# Patient Record
Sex: MALE | Race: WHITE | NOT HISPANIC OR LATINO | Employment: UNEMPLOYED | URBAN - METROPOLITAN AREA
[De-identification: names, ages, dates, MRNs, and addresses within clinical notes are randomized per-mention and may not be internally consistent; named-entity substitution may affect disease eponyms.]

---

## 2018-10-19 ENCOUNTER — HOSPITAL ENCOUNTER (OUTPATIENT)
Dept: PAIN MEDICINE | Facility: HOSPITAL | Age: 23
Discharge: HOME | End: 2018-10-19
Attending: ANESTHESIOLOGY | Admitting: ANESTHESIOLOGY

## 2019-06-18 ENCOUNTER — HOSPITAL ENCOUNTER (EMERGENCY)
Facility: HOSPITAL | Age: 24
Discharge: HOME | End: 2019-06-18
Attending: NURSE PRACTITIONER | Admitting: EMERGENCY MEDICINE

## 2019-06-18 VITALS
BODY MASS INDEX: 27.47 KG/M2 | TEMPERATURE: 98.3 F | RESPIRATION RATE: 16 BRPM | OXYGEN SATURATION: 99 % | SYSTOLIC BLOOD PRESSURE: 110 MMHG | DIASTOLIC BLOOD PRESSURE: 73 MMHG | HEIGHT: 62 IN | HEART RATE: 79 BPM | WEIGHT: 149.25 LBS

## 2019-06-18 DIAGNOSIS — L03.116 CELLULITIS OF LEFT LOWER LEG: Primary | ICD-10-CM

## 2019-06-18 PROCEDURE — 99282 EMERGENCY DEPT VISIT SF MDM: CPT

## 2019-06-18 RX ORDER — SULFAMETHOXAZOLE AND TRIMETHOPRIM 800; 160 MG/1; MG/1
1 TABLET ORAL 2 TIMES DAILY
Qty: 20 TABLET | Refills: 0 | Status: SHIPPED | OUTPATIENT
Start: 2019-06-18 | End: 2019-07-24

## 2019-07-24 ENCOUNTER — HOSPITAL ENCOUNTER (EMERGENCY)
Facility: HOSPITAL | Age: 24
Discharge: HOME | End: 2019-07-24
Admitting: EMERGENCY MEDICINE

## 2019-07-24 VITALS
RESPIRATION RATE: 18 BRPM | BODY MASS INDEX: 22.13 KG/M2 | WEIGHT: 146 LBS | TEMPERATURE: 98.1 F | HEIGHT: 68 IN | DIASTOLIC BLOOD PRESSURE: 69 MMHG | HEART RATE: 77 BPM | OXYGEN SATURATION: 97 % | SYSTOLIC BLOOD PRESSURE: 124 MMHG

## 2019-07-24 DIAGNOSIS — H66.011 ACUTE SUPPURATIVE OTITIS MEDIA OF RIGHT EAR WITH SPONTANEOUS RUPTURE OF TYMPANIC MEMBRANE, RECURRENCE NOT SPECIFIED: Primary | ICD-10-CM

## 2019-07-24 PROCEDURE — 99283 EMERGENCY DEPT VISIT LOW MDM: CPT

## 2019-07-24 RX ORDER — HYDROCODONE BITARTRATE AND ACETAMINOPHEN 5; 325 MG/1; MG/1
1 TABLET ORAL ONCE AS NEEDED
Status: DISCONTINUED | OUTPATIENT
Start: 2019-07-24 | End: 2019-07-24 | Stop reason: HOSPADM

## 2019-07-24 RX ORDER — AMOXICILLIN AND CLAVULANATE POTASSIUM 875; 125 MG/1; MG/1
1 TABLET, FILM COATED ORAL 2 TIMES DAILY
Qty: 20 TABLET | Refills: 0 | Status: SHIPPED | OUTPATIENT
Start: 2019-07-24 | End: 2019-08-03

## 2019-07-24 RX ADMIN — HYDROCODONE BITARTRATE AND ACETAMINOPHEN 1 TABLET: 5; 325 TABLET ORAL at 13:34

## 2019-07-26 DIAGNOSIS — H66.009 ACUTE SUPPURATIVE OTITIS MEDIA WITHOUT SPONTANEOUS RUPTURE OF EAR DRUM, RECURRENCE NOT SPECIFIED, UNSPECIFIED LATERALITY: Primary | ICD-10-CM

## 2019-08-10 ENCOUNTER — APPOINTMENT (OUTPATIENT)
Dept: GENERAL RADIOLOGY | Facility: HOSPITAL | Age: 24
End: 2019-08-10

## 2019-08-10 ENCOUNTER — HOSPITAL ENCOUNTER (EMERGENCY)
Facility: HOSPITAL | Age: 24
Discharge: HOME | End: 2019-08-10
Attending: EMERGENCY MEDICINE | Admitting: EMERGENCY MEDICINE

## 2019-08-10 VITALS
HEART RATE: 67 BPM | SYSTOLIC BLOOD PRESSURE: 122 MMHG | DIASTOLIC BLOOD PRESSURE: 74 MMHG | HEIGHT: 69 IN | RESPIRATION RATE: 14 BRPM | WEIGHT: 140 LBS | OXYGEN SATURATION: 98 % | TEMPERATURE: 98.3 F | BODY MASS INDEX: 20.73 KG/M2

## 2019-08-10 DIAGNOSIS — IMO0001 GRADE 2 ANKLE SPRAIN, LEFT, INITIAL ENCOUNTER: Primary | ICD-10-CM

## 2019-08-10 PROCEDURE — 73630 X-RAY EXAM OF FOOT: CPT

## 2019-08-10 PROCEDURE — 99284 EMERGENCY DEPT VISIT MOD MDM: CPT

## 2019-08-10 PROCEDURE — 25010000002 PROMETHAZINE PER 50 MG: Performed by: EMERGENCY MEDICINE

## 2019-08-10 PROCEDURE — 73610 X-RAY EXAM OF ANKLE: CPT

## 2019-08-10 PROCEDURE — 96372 THER/PROPH/DIAG INJ SC/IM: CPT

## 2019-08-10 PROCEDURE — 25010000002 MORPHINE PER 10 MG: Performed by: EMERGENCY MEDICINE

## 2019-08-10 RX ORDER — MORPHINE SULFATE 4 MG/ML
4 INJECTION, SOLUTION INTRAMUSCULAR; INTRAVENOUS ONCE
Status: COMPLETED | OUTPATIENT
Start: 2019-08-10 | End: 2019-08-10

## 2019-08-10 RX ORDER — PROMETHAZINE HYDROCHLORIDE 25 MG/ML
12.5 INJECTION, SOLUTION INTRAMUSCULAR; INTRAVENOUS ONCE
Status: COMPLETED | OUTPATIENT
Start: 2019-08-10 | End: 2019-08-10

## 2019-08-10 RX ORDER — ACETAMINOPHEN AND CODEINE PHOSPHATE 300; 30 MG/1; MG/1
1 TABLET ORAL EVERY 6 HOURS PRN
Qty: 8 TABLET | Refills: 0 | Status: SHIPPED | OUTPATIENT
Start: 2019-08-10 | End: 2020-07-27

## 2019-08-10 RX ORDER — OMEPRAZOLE 20 MG/1
20 CAPSULE, DELAYED RELEASE ORAL DAILY
COMMUNITY
End: 2019-09-25 | Stop reason: SDUPTHER

## 2019-08-10 RX ORDER — LEVETIRACETAM 500 MG/1
500 TABLET ORAL 2 TIMES DAILY
COMMUNITY
End: 2021-10-02

## 2019-08-10 RX ADMIN — PROMETHAZINE HYDROCHLORIDE 12.5 MG: 25 INJECTION INTRAMUSCULAR; INTRAVENOUS at 19:48

## 2019-08-10 RX ADMIN — MORPHINE SULFATE 4 MG: 4 INJECTION INTRAVENOUS at 19:47

## 2019-08-11 NOTE — ED PROVIDER NOTES
Subjective   23-year-old states he slipped off a ladder and had an inversion type mechanism injury to his left foot and ankle.  He states it hurts on the outside of the ankle.  He reports no decrease in sensation or circulation.  Reports no proximal pain or other injury.  He denies heel pain or low back pain            Review of Systems   Musculoskeletal: Positive for joint swelling.   All other systems reviewed and are negative.      Past Medical History:   Diagnosis Date   • GERD (gastroesophageal reflux disease)    • Seizures (CMS/HCC)        Allergies   Allergen Reactions   • Tramadol Nausea And Vomiting       History reviewed. No pertinent surgical history.    History reviewed. No pertinent family history.    Social History     Socioeconomic History   • Marital status: Single     Spouse name: Not on file   • Number of children: Not on file   • Years of education: Not on file   • Highest education level: Not on file   Tobacco Use   • Smoking status: Current Every Day Smoker     Packs/day: 1.00   Substance and Sexual Activity   • Alcohol use: Yes     Frequency: Never   • Drug use: Yes     Frequency: 4.0 times per week     Types: Marijuana   • Sexual activity: Defer           Objective   Physical Exam  Alert nontoxic [place] Coma Scale 15  Back: No lumbar tenderness  Left lower extremity: Moderate lateral ankle contusion and edema.  Garibay's test is normal.  There is no calcaneal tenderness.  Patient has some discomfort noted in the midfoot it the level of the base the fifth metatarsal.  Procedures           ED Course        Labs Reviewed - No data to display  Medications   Morphine sulfate (PF) injection 4 mg (4 mg Intramuscular Given 8/10/19 1947)   promethazine (PHENERGAN) injection 12.5 mg (12.5 mg Intramuscular Given 8/10/19 1948)     No radiology results for the last day            MDM  Number of Diagnoses or Management Options     Amount and/or Complexity of Data Reviewed  Tests in the radiology section  of CPT®: reviewed  Independent visualization of images, tracings, or specimens: yes    Risk of Complications, Morbidity, and/or Mortality  Presenting problems: moderate  Diagnostic procedures: moderate  Management options: moderate  General comments: Patient was fitted with crutches and was able to demonstrate correct use.  The patient was fitted in an Aircast and was neurovascular intact afterwards.  Vocalized understanding of discharge instructions and warnings    Patient Progress  Patient progress: improved        Final diagnoses:   Grade 2 ankle sprain, left, initial encounter            Saad Tavera MD  08/10/19 5753

## 2019-08-11 NOTE — DISCHARGE INSTRUCTIONS
Rest, elevate next 24 hours  Use crutches and no weightbearing for the next 5 days  Use Aircast for the next 5 days  Follow-up with primary care provider or orthopedist

## 2019-09-25 ENCOUNTER — OFFICE VISIT (OUTPATIENT)
Dept: FAMILY MEDICINE CLINIC | Facility: CLINIC | Age: 24
End: 2019-09-25

## 2019-09-25 VITALS
BODY MASS INDEX: 22.44 KG/M2 | OXYGEN SATURATION: 100 % | HEART RATE: 72 BPM | DIASTOLIC BLOOD PRESSURE: 75 MMHG | WEIGHT: 143 LBS | TEMPERATURE: 97.4 F | HEIGHT: 67 IN | SYSTOLIC BLOOD PRESSURE: 116 MMHG

## 2019-09-25 DIAGNOSIS — M54.50 CHRONIC MIDLINE LOW BACK PAIN WITHOUT SCIATICA: Primary | ICD-10-CM

## 2019-09-25 DIAGNOSIS — G89.29 CHRONIC MIDLINE LOW BACK PAIN WITHOUT SCIATICA: Primary | ICD-10-CM

## 2019-09-25 DIAGNOSIS — K21.9 GASTROESOPHAGEAL REFLUX DISEASE WITHOUT ESOPHAGITIS: ICD-10-CM

## 2019-09-25 PROCEDURE — 99213 OFFICE O/P EST LOW 20 MIN: CPT | Performed by: FAMILY MEDICINE

## 2019-09-25 RX ORDER — OMEPRAZOLE 20 MG/1
20 CAPSULE, DELAYED RELEASE ORAL 2 TIMES DAILY
Qty: 60 CAPSULE | Refills: 2 | Status: SHIPPED | OUTPATIENT
Start: 2019-09-25 | End: 2020-01-22

## 2019-09-25 NOTE — ASSESSMENT & PLAN NOTE
"Tylenol for pain prn.  Avoid NSAIDs due to stomach problems.   X-rays ordered.  He says he had some in the ER in the last few months.  He has been to the ER for multiple problems but the last lumbar x-rays were done over a year ago.  Pt upset that he \"just has to live with the pain\".  I told him he can take Tylenol.  He may need PT but I want to see x-rays first.  I am not giving him controlled substance.  "

## 2019-09-25 NOTE — ASSESSMENT & PLAN NOTE
Counseled to take Prilosec 20 me every morning.  May take a second dose later in the day if needed.

## 2019-09-25 NOTE — PROGRESS NOTES
Subjective   Chief Complaint   Patient presents with   • Back Pain   • Heartburn     refill med     Lee Peck is a 24 y.o. male.     Accompanied by his mother.    He takes Prilosec but does not take it every day.  If he does not get relief within 30 minutes of taking the medicine he takes another one.  I instructed him to take the medicine every day so that it will work better.          Heartburn   He complains of abdominal pain and heartburn. He reports no chest pain, no coughing, no globus sensation, no hoarse voice, no nausea, no sore throat or no stridor. This is a recurrent problem. The current episode started more than 1 year ago. The problem occurs frequently. The problem has been waxing and waning. The heartburn duration is several minutes. The heartburn is located in the substernum. The heartburn is of severe intensity. The heartburn wakes him from sleep. The heartburn doesn't change with position. The symptoms are aggravated by certain foods. Pertinent negatives include no anemia, fatigue or melena. Risk factors include smoking/tobacco exposure. He has tried a PPI for the symptoms. The treatment provided mild relief.   Back Pain   This is a chronic problem. The current episode started more than 1 year ago. The problem occurs daily. The problem has been gradually worsening since onset. The pain is present in the lumbar spine. The quality of the pain is described as aching. The pain does not radiate. The pain is moderate. The symptoms are aggravated by bending. Associated symptoms include abdominal pain. Pertinent negatives include no chest pain, fever or headaches. He has tried analgesics for the symptoms. The treatment provided mild relief.      Past Medical History:   Diagnosis Date   • GERD (gastroesophageal reflux disease)    • Seizures (CMS/HCC)      History reviewed. No pertinent surgical history.  Allergies   Allergen Reactions   • Tramadol Nausea And Vomiting     Social History     Socioeconomic  History   • Marital status: Single     Spouse name: Not on file   • Number of children: Not on file   • Years of education: Not on file   • Highest education level: Not on file   Tobacco Use   • Smoking status: Current Every Day Smoker     Packs/day: 1.00     Years: 8.00     Pack years: 8.00     Types: Cigarettes, Electronic Cigarette   • Smokeless tobacco: Never Used   Substance and Sexual Activity   • Alcohol use: Yes     Frequency: Monthly or less     Comment: rarely; caffeine 8-10c qd   • Drug use: Yes     Frequency: 4.0 times per week     Types: Marijuana   • Sexual activity: Defer     Social History     Tobacco Use   Smoking Status Current Every Day Smoker   • Packs/day: 1.00   • Years: 8.00   • Pack years: 8.00   • Types: Cigarettes, Electronic Cigarette   Smokeless Tobacco Never Used       family history is not on file.  Current Outpatient Medications on File Prior to Visit   Medication Sig Dispense Refill   • acetaminophen-codeine (TYLENOL #3) 300-30 MG per tablet Take 1 tablet by mouth Every 6 (Six) Hours As Needed for Moderate Pain . 8 tablet 0   • levETIRAcetam (KEPPRA) 500 MG tablet Take 500 mg by mouth 2 (Two) Times a Day.     • [DISCONTINUED] omeprazole (priLOSEC) 20 MG capsule Take 20 mg by mouth Daily.       No current facility-administered medications on file prior to visit.      Patient Active Problem List   Diagnosis   • Epilepsy without status epilepticus, not intractable (CMS/HCC)   • Low back pain   • Gastroesophageal reflux disease without esophagitis       The following portions of the patient's history were reviewed and updated as appropriate: allergies, current medications, past family history, past medical history, past social history, past surgical history and problem list.    Review of Systems   Constitutional: Negative for chills, fatigue and fever.   HENT: Negative for hoarse voice, sinus pressure and sore throat.    Eyes: Negative for blurred vision.   Respiratory: Negative for cough  "and shortness of breath.    Cardiovascular: Negative for chest pain and palpitations.   Gastrointestinal: Positive for abdominal pain. Negative for melena and nausea.   Endocrine: Negative for polyuria.   Musculoskeletal: Positive for back pain.   Skin: Negative for rash.   Neurological: Negative for dizziness and headache.   Hematological: Negative for adenopathy.   Psychiatric/Behavioral: Negative for depressed mood.       Objective   /75 (BP Location: Left arm, Patient Position: Sitting, Cuff Size: Adult)   Pulse 72   Temp 97.4 °F (36.3 °C) (Oral)   Ht 168.9 cm (66.5\")   Wt 64.9 kg (143 lb)   SpO2 100%   BMI 22.74 kg/m²   Physical Exam   Constitutional: He is oriented to person, place, and time. He appears well-developed. No distress.   HENT:   Head: Normocephalic.   Eyes: Conjunctivae and lids are normal.   Neck: Trachea normal. No thyroid mass and no thyromegaly present.   Cardiovascular: Normal rate, regular rhythm and normal heart sounds.   Pulmonary/Chest: Effort normal and breath sounds normal.   Musculoskeletal: He exhibits tenderness.   Lymphadenopathy:     He has no cervical adenopathy.   Neurological: He is alert and oriented to person, place, and time.   Skin: Skin is warm and dry.   Psychiatric: He has a normal mood and affect. His speech is normal and behavior is normal. He is attentive.       No visits with results within 1 Week(s) from this visit.   Latest known visit with results is:   Admission on 06/13/2019, Discharged on 06/13/2019   Component Date Value Ref Range Status   • Strep A Ag 06/13/2019  POSITIVE* NEGATIVE Final           Assessment/Plan   Problems Addressed this Visit        Digestive    Gastroesophageal reflux disease without esophagitis     Counseled to take Prilosec 20 me every morning.  May take a second dose later in the day if needed.          Relevant Medications    omeprazole (priLOSEC) 20 MG capsule       Nervous and Auditory    Low back pain - Primary     " "Tylenol for pain prn.  Avoid NSAIDs due to stomach problems.   X-rays ordered.  He says he had some in the ER in the last few months.  He has been to the ER for multiple problems but the last lumbar x-rays were done over a year ago.  Pt upset that he \"just has to live with the pain\".  I told him he can take Tylenol.  He may need PT but I want to see x-rays first.  I am not giving him controlled substance.         Relevant Orders    XR Spine Lumbar 2 or 3 View          Lee was seen today for back pain and heartburn.    Diagnoses and all orders for this visit:    Chronic midline low back pain without sciatica  -     XR Spine Lumbar 2 or 3 View; Future    Gastroesophageal reflux disease without esophagitis    Other orders  -     omeprazole (priLOSEC) 20 MG capsule; Take 1 capsule by mouth 2 (Two) Times a Day.           "

## 2019-09-25 NOTE — PATIENT INSTRUCTIONS
Acute Back Pain, Adult  Acute back pain is sudden and usually short-lived. It is often caused by an injury to the muscles and tissues in the back. The injury may result from:  · A muscle or ligament getting overstretched or torn (strained). Ligaments are tissues that connect bones to each other. Lifting something improperly can cause a back strain.  · Wear and tear (degeneration) of the spinal disks. Spinal disks are circular tissue that provides cushioning between the bones of the spine (vertebrae).  · Twisting motions, such as while playing sports or doing yard work.  · A hit to the back.  · Arthritis.  You may have a physical exam, lab tests, and imaging tests to find the cause of your pain. Acute back pain usually goes away with rest and home care.  Follow these instructions at home:  Managing pain, stiffness, and swelling  · Take over-the-counter and prescription medicines only as told by your health care provider.  · Your health care provider may recommend applying ice during the first 24-48 hours after your pain starts. To do this:  ? Put ice in a plastic bag.  ? Place a towel between your skin and the bag.  ? Leave the ice on for 20 minutes, 2-3 times a day.  · If directed, apply heat to the affected area as often as told by your health care provider. Use the heat source that your health care provider recommends, such as a moist heat pack or a heating pad.  ? Place a towel between your skin and the heat source.  ? Leave the heat on for 20-30 minutes.  ? Remove the heat if your skin turns bright red. This is especially important if you are unable to feel pain, heat, or cold. You have a greater risk of getting burned.  Activity    · Do not stay in bed. Staying in bed for more than 1-2 days can delay your recovery.  · Sit up and stand up straight. Avoid leaning forward when you sit, or hunching over when you stand.  ? If you work at a desk, sit close to it so you do not need to lean over. Keep your chin tucked  "in. Keep your neck drawn back, and keep your elbows bent at a right angle. Your arms should look like the letter \"L.\"  ? Sit high and close to the steering wheel when you drive. Add lower back (lumbar) support to your car seat, if needed.  · Take short walks on even surfaces as soon as you are able. Try to increase the length of time you walk each day.  · Do not sit, drive, or  one place for more than 30 minutes at a time. Sitting or standing for long periods of time can put stress on your back.  · Do not drive or use heavy machinery while taking prescription pain medicine.  · Use proper lifting techniques. When you bend and lift, use positions that put less stress on your back:  ? Bend your knees.  ? Keep the load close to your body.  ? Avoid twisting.  · Exercise regularly as told by your health care provider. Exercising helps your back heal faster and helps prevent back injuries by keeping muscles strong and flexible.  · Work with a physical therapist to make a safe exercise program, as recommended by your health care provider. Do any exercises as told by your physical therapist.  Lifestyle  · Maintain a healthy weight. Extra weight puts stress on your back and makes it difficult to have good posture.  · Avoid activities or situations that make you feel anxious or stressed. Stress and anxiety increase muscle tension and can make back pain worse. Learn ways to manage anxiety and stress, such as through exercise.  General instructions    · Sleep on a firm mattress in a comfortable position. Try lying on your side with your knees slightly bent. If you lie on your back, put a pillow under your knees.  · Follow your treatment plan as told by your health care provider. This may include:  ? Cognitive or behavioral therapy.  ? Acupuncture or massage therapy.  ? Meditation or yoga.  Contact a health care provider if:  · You have pain that is not relieved with rest or medicine.  · You have increasing pain going " down into your legs or buttocks.  · Your pain does not improve after 2 weeks.  · You have pain at night.  · You lose weight without trying.  · You have a fever or chills.  Get help right away if:  · You develop new bowel or bladder control problems.  · You have unusual weakness or numbness in your arms or legs.  · You develop nausea or vomiting.  · You develop abdominal pain.  · You feel faint.  Summary  · Acute back pain is sudden and usually short-lived.  · Use proper lifting techniques. When you bend and lift, use positions that put less stress on your back.  · Take over-the-counter and prescription medicines and apply heat or ice as directed by your health care provider.  This information is not intended to replace advice given to you by your health care provider. Make sure you discuss any questions you have with your health care provider.  Document Released: 12/18/2006 Document Revised: 08/01/2018 Document Reviewed: 08/01/2018  [employer] Interactive Patient Education © 2019 [employer]

## 2019-10-13 ENCOUNTER — HOSPITAL ENCOUNTER (EMERGENCY)
Facility: HOSPITAL | Age: 24
Discharge: HOME | End: 2019-10-13
Admitting: EMERGENCY MEDICINE

## 2019-10-13 ENCOUNTER — APPOINTMENT (OUTPATIENT)
Dept: GENERAL RADIOLOGY | Facility: HOSPITAL | Age: 24
End: 2019-10-13

## 2019-10-13 VITALS
OXYGEN SATURATION: 100 % | RESPIRATION RATE: 16 BRPM | HEIGHT: 68 IN | BODY MASS INDEX: 21.22 KG/M2 | TEMPERATURE: 98.6 F | HEART RATE: 63 BPM | SYSTOLIC BLOOD PRESSURE: 120 MMHG | DIASTOLIC BLOOD PRESSURE: 63 MMHG | WEIGHT: 140 LBS

## 2019-10-13 DIAGNOSIS — G89.29 CHRONIC BILATERAL LOW BACK PAIN WITHOUT SCIATICA: Primary | ICD-10-CM

## 2019-10-13 DIAGNOSIS — M54.50 CHRONIC BILATERAL LOW BACK PAIN WITHOUT SCIATICA: Primary | ICD-10-CM

## 2019-10-13 LAB
BILIRUB UR QL STRIP: NEGATIVE
CLARITY UR: CLEAR
COLOR UR: YELLOW
GLUCOSE UR STRIP-MCNC: NEGATIVE MG/DL
HGB UR QL STRIP.AUTO: NEGATIVE
KETONES UR QL STRIP: NEGATIVE
LEUKOCYTE ESTERASE UR QL STRIP.AUTO: NEGATIVE
NITRITE UR QL STRIP: NEGATIVE
PH UR STRIP.AUTO: 7.5 [PH] (ref 5–8)
PROT UR QL STRIP: NEGATIVE
SP GR UR STRIP: 1.02 (ref 1–1.03)
UROBILINOGEN UR QL STRIP: NORMAL

## 2019-10-13 PROCEDURE — 81003 URINALYSIS AUTO W/O SCOPE: CPT | Performed by: PHYSICIAN ASSISTANT

## 2019-10-13 PROCEDURE — 72110 X-RAY EXAM L-2 SPINE 4/>VWS: CPT

## 2019-10-13 PROCEDURE — 99283 EMERGENCY DEPT VISIT LOW MDM: CPT

## 2019-10-13 RX ORDER — METHYLPREDNISOLONE 4 MG/1
TABLET ORAL
Qty: 21 TABLET | Refills: 0 | Status: SHIPPED | OUTPATIENT
Start: 2019-10-13 | End: 2020-07-23 | Stop reason: SDUPTHER

## 2019-10-13 RX ORDER — LIDOCAINE 50 MG/G
1 PATCH TOPICAL EVERY 24 HOURS
Qty: 30 PATCH | Refills: 0 | Status: SHIPPED | OUTPATIENT
Start: 2019-10-13 | End: 2020-07-23 | Stop reason: SDUPTHER

## 2019-10-13 NOTE — ED PROVIDER NOTES
Subjective   History:  Patient is 24-year-old male who presents to the ER with low back pain.  He reports he is had low back pain since he was 16 years old.  He sees Jaquelin Medina she went to get x-rays before putting him into physical therapy.  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  Patient denies any bowel or bladder incontinence    Onset: Acutely worse 1 week  Location: Low back  Duration: Constant  Character: Sharp pain  Aggravating/Alleviating factors: None  Radiation none  Severity: Moderate              Review of Systems   Constitutional: Negative.    HENT: Negative.    Respiratory: Negative.    Cardiovascular: Negative.    Gastrointestinal: Negative.    Genitourinary: Negative.    Musculoskeletal: Positive for back pain.   Skin: Negative.    Neurological: Negative.    Psychiatric/Behavioral: Negative.        Past Medical History:   Diagnosis Date   • GERD (gastroesophageal reflux disease)    • Seizures (CMS/HCC)        Allergies   Allergen Reactions   • Tramadol Nausea And Vomiting       No past surgical history on file.    No family history on file.    Social History     Socioeconomic History   • Marital status: Single     Spouse name: Not on file   • Number of children: Not on file   • Years of education: Not on file   • Highest education level: Not on file   Tobacco Use   • Smoking status: Current Every Day Smoker     Packs/day: 1.00     Years: 8.00     Pack years: 8.00     Types: Cigarettes, Electronic Cigarette   • Smokeless tobacco: Never Used   Substance and Sexual Activity   • Alcohol use: Yes     Frequency: Monthly or less     Comment: rarely; caffeine 8-10c qd   • Drug use: Yes     Frequency: 4.0 times per week     Types: Marijuana   • Sexual activity: Defer           Objective   Physical Exam   Constitutional: He is oriented to person, place, and time. He appears well-developed and well-nourished.   HENT:    Head: Normocephalic and atraumatic.   Eyes: Pupils are equal, round, and reactive to light.   Neck: Normal range of motion.   Pulmonary/Chest: Effort normal.   Musculoskeletal: Normal range of motion.   Patient able to ambulate without difficulty.  Flexion-extension of back intact without difficulty.  No signs of step-off or pain over the spinous process.   Neurological: He is alert and oriented to person, place, and time.   Skin: Skin is warm and dry.   Psychiatric: He has a normal mood and affect. His behavior is normal.       Procedures           ED Course        Xr Spine Lumbar 4+ View    Result Date: 10/13/2019   1. No radiographic evidence of acute fracture or subluxation. 2. Stable disc space narrowing at L5-S1.  Electronically Signed By-Clem Ragland On:10/13/2019 1:33 PM This report was finalized on [id number] by  Clem Ragland, .    Labs Reviewed   URINALYSIS W/ CULTURE IF INDICATED - Normal    Narrative:     Urine microscopic not indicated.     Medications - No data to display            MDM  Number of Diagnoses or Management Options  Chronic bilateral low back pain without sciatica:   Diagnosis management comments: DISPOSITION:   Chart Review:  Comorbidity:  has a past medical history of GERD (gastroesophageal reflux disease) and Seizures (CMS/HCC).  Differentials:this list is not all inclusive and does not constitute the entirety of considered causes --> UTI or nephrolithiasis chronic OA of low back  Labs: UA unremarkable    Imaging: Was interpreted by physician and reviewed by myself:  Xr Spine Lumbar 4+ View    Result Date: 10/13/2019   1. No radiographic evidence of acute fracture or subluxation. 2. Stable disc space narrowing at L5-S1.  Electronically Signed ByRoel Ragland On:10/13/2019 1:33 PM This report was finalized on [id number] by  Clem Ragland, .      Disposition/Treatment:  24-year-old male who presents to the ER with low back pain his x-rays were negative.  Patient was told to  follow-up with his PCP tomorrow he was given a Medrol Dosepak and lidocaine patches he was told to continue taking Tylenol.  He was stable at time of discharge return precautions all concerns were provided he was in agreement with plan       Amount and/or Complexity of Data Reviewed  Clinical lab tests: reviewed  Tests in the radiology section of CPT®: reviewed        Final diagnoses:   Chronic bilateral low back pain without sciatica              Krystyna Robison PA-C  10/13/19 9122

## 2019-10-13 NOTE — DISCHARGE INSTRUCTIONS
Return to the ER for any worsening symptoms.  Follow-up with your primary care doctor tomorrow for further management and possible physical therapy orders.

## 2019-10-14 ENCOUNTER — TELEPHONE (OUTPATIENT)
Dept: FAMILY MEDICINE CLINIC | Facility: CLINIC | Age: 24
End: 2019-10-14

## 2019-10-14 DIAGNOSIS — G89.29 CHRONIC MIDLINE LOW BACK PAIN WITHOUT SCIATICA: Primary | ICD-10-CM

## 2019-10-14 DIAGNOSIS — M54.50 CHRONIC MIDLINE LOW BACK PAIN WITHOUT SCIATICA: Primary | ICD-10-CM

## 2019-10-16 NOTE — TELEPHONE ENCOUNTER
His x-rays are normal.  I am not going to send in anything pain since his x-rays are normal.  Does he want a referral to Pain Mgmt?

## 2019-11-21 ENCOUNTER — OFFICE VISIT (OUTPATIENT)
Dept: PAIN MEDICINE | Facility: CLINIC | Age: 24
End: 2019-11-21

## 2019-11-21 VITALS
HEART RATE: 64 BPM | WEIGHT: 147 LBS | DIASTOLIC BLOOD PRESSURE: 73 MMHG | BODY MASS INDEX: 22.28 KG/M2 | SYSTOLIC BLOOD PRESSURE: 111 MMHG | HEIGHT: 68 IN | OXYGEN SATURATION: 100 % | RESPIRATION RATE: 16 BRPM | TEMPERATURE: 98.3 F

## 2019-11-21 DIAGNOSIS — M51.36 DDD (DEGENERATIVE DISC DISEASE), LUMBAR: ICD-10-CM

## 2019-11-21 DIAGNOSIS — G89.29 CHRONIC MIDLINE LOW BACK PAIN WITHOUT SCIATICA: Primary | ICD-10-CM

## 2019-11-21 DIAGNOSIS — M54.50 CHRONIC MIDLINE LOW BACK PAIN WITHOUT SCIATICA: Primary | ICD-10-CM

## 2019-11-21 DIAGNOSIS — M54.16 LUMBAR RADICULOPATHY: ICD-10-CM

## 2019-11-21 PROBLEM — M51.369 DDD (DEGENERATIVE DISC DISEASE), LUMBAR: Status: ACTIVE | Noted: 2019-11-21

## 2019-11-21 PROCEDURE — G0463 HOSPITAL OUTPT CLINIC VISIT: HCPCS | Performed by: PHYSICAL MEDICINE & REHABILITATION

## 2019-11-21 PROCEDURE — 99204 OFFICE O/P NEW MOD 45 MIN: CPT | Performed by: PHYSICAL MEDICINE & REHABILITATION

## 2019-11-21 NOTE — PROGRESS NOTES
Subjective   Lee Peck is a 24 y.o. male.     Chronic LBP, began playing football, midline, saw Dr. Lalo dumont with lumbar radiculopathy, deep, stabbing, always present, varies, worse with bending, lifting, lying down, interferes with ADLs, sleep, work, failed PT. X-ray with L5/S1 DDD. Saw PCP, notes reviewed, as above, failed Lidoderm, PT. No FH of substance abuse.         The following portions of the patient's history were reviewed and updated as appropriate: allergies, current medications, past family history, past medical history, past social history, past surgical history and problem list.    Review of Systems   Constitutional: Negative for chills, fatigue and fever.   HENT: Negative for hearing loss and trouble swallowing.    Eyes: Negative for visual disturbance.   Respiratory: Negative for shortness of breath.    Cardiovascular: Negative for chest pain.   Gastrointestinal: Negative for abdominal pain, constipation, diarrhea, nausea and vomiting.   Genitourinary: Negative for urinary incontinence.   Musculoskeletal: Positive for back pain. Negative for arthralgias, joint swelling, myalgias and neck pain.   Neurological: Negative for dizziness, weakness, numbness and headache.       Objective   Physical Exam   Constitutional: He is oriented to person, place, and time. He appears well-developed and well-nourished.   HENT:   Head: Normocephalic and atraumatic.   Eyes: EOM are normal. Pupils are equal, round, and reactive to light.   Neck: Normal range of motion.   Cardiovascular: Normal rate, regular rhythm, normal heart sounds and intact distal pulses.   Pulmonary/Chest: Breath sounds normal.   Abdominal: Soft. Bowel sounds are normal. He exhibits no distension. There is no tenderness.   Neurological: He is alert and oriented to person, place, and time. He has normal strength and normal reflexes. He displays normal reflexes. No sensory deficit.   Psychiatric: He has a normal mood and affect. His behavior is  normal. Thought content normal.         Assessment/Plan   Lee was seen today for back pain.    Diagnoses and all orders for this visit:    Chronic midline low back pain without sciatica    Lumbar radiculopathy    DDD (degenerative disc disease), lumbar      Order MRI L-spine  Schedule 3 L5/S1 ILESIs pending MRI results.  Begin Flector.  Order LSO.  RTC for ESIs.

## 2019-11-24 ENCOUNTER — HOSPITAL ENCOUNTER (EMERGENCY)
Facility: HOSPITAL | Age: 24
Discharge: HOME | End: 2019-11-24
Attending: EMERGENCY MEDICINE | Admitting: EMERGENCY MEDICINE

## 2019-11-24 ENCOUNTER — APPOINTMENT (OUTPATIENT)
Dept: GENERAL RADIOLOGY | Facility: HOSPITAL | Age: 24
End: 2019-11-24

## 2019-11-24 VITALS
BODY MASS INDEX: 23.07 KG/M2 | RESPIRATION RATE: 15 BRPM | HEIGHT: 67 IN | SYSTOLIC BLOOD PRESSURE: 120 MMHG | OXYGEN SATURATION: 97 % | DIASTOLIC BLOOD PRESSURE: 68 MMHG | HEART RATE: 71 BPM | TEMPERATURE: 98.9 F | WEIGHT: 147 LBS

## 2019-11-24 DIAGNOSIS — R07.9 CHEST PAIN, UNSPECIFIED TYPE: ICD-10-CM

## 2019-11-24 DIAGNOSIS — R11.2 NAUSEA AND VOMITING, INTRACTABILITY OF VOMITING NOT SPECIFIED, UNSPECIFIED VOMITING TYPE: Primary | ICD-10-CM

## 2019-11-24 PROCEDURE — 71045 X-RAY EXAM CHEST 1 VIEW: CPT

## 2019-11-24 PROCEDURE — 99284 EMERGENCY DEPT VISIT MOD MDM: CPT

## 2019-11-24 PROCEDURE — 93005 ELECTROCARDIOGRAM TRACING: CPT

## 2019-11-24 PROCEDURE — 93005 ELECTROCARDIOGRAM TRACING: CPT | Performed by: EMERGENCY MEDICINE

## 2019-11-24 PROCEDURE — 96372 THER/PROPH/DIAG INJ SC/IM: CPT

## 2019-11-24 PROCEDURE — 25010000002 PROMETHAZINE PER 50 MG: Performed by: EMERGENCY MEDICINE

## 2019-11-24 RX ORDER — PROMETHAZINE HYDROCHLORIDE 25 MG/ML
25 INJECTION, SOLUTION INTRAMUSCULAR; INTRAVENOUS ONCE
Status: COMPLETED | OUTPATIENT
Start: 2019-11-24 | End: 2019-11-24

## 2019-11-24 RX ORDER — NAPROXEN 375 MG/1
375 TABLET ORAL 2 TIMES DAILY PRN
Qty: 14 TABLET | Refills: 0 | Status: SHIPPED | OUTPATIENT
Start: 2019-11-24 | End: 2020-07-23 | Stop reason: SDUPTHER

## 2019-11-24 RX ORDER — ONDANSETRON 4 MG/1
4 TABLET, ORALLY DISINTEGRATING ORAL EVERY 8 HOURS PRN
Qty: 12 TABLET | Refills: 0 | Status: SHIPPED | OUTPATIENT
Start: 2019-11-24 | End: 2020-07-27

## 2019-11-24 RX ADMIN — PROMETHAZINE HYDROCHLORIDE 25 MG: 25 INJECTION INTRAMUSCULAR; INTRAVENOUS at 20:27

## 2019-11-25 NOTE — ED PROVIDER NOTES
Subjective   Patient is a 21-year-old male complaint of sharp pain in his chest for the past 2 hours after he started vomiting.  The pain is worse in certain motions and deep breathing and coughing.  He denies fever previous chest pain diarrhea or other complaint            Review of Systems  Negative for headache earache sore throat cough fever shortness of breath abdominal pain bombarded dysuria or other complaint.  Past Medical History:   Diagnosis Date   • GERD (gastroesophageal reflux disease)    • Low back pain    • Seizures (CMS/HCC)        Allergies   Allergen Reactions   • Tramadol Nausea And Vomiting       No past surgical history on file.    Family History   Problem Relation Age of Onset   • COPD Mother    • VIVIANA disease Father        Social History     Socioeconomic History   • Marital status: Single     Spouse name: Not on file   • Number of children: Not on file   • Years of education: Not on file   • Highest education level: Not on file   Tobacco Use   • Smoking status: Current Every Day Smoker     Packs/day: 1.00     Years: 8.00     Pack years: 8.00     Types: Cigarettes, Electronic Cigarette   • Smokeless tobacco: Never Used   Substance and Sexual Activity   • Alcohol use: Yes     Frequency: Monthly or less     Comment: rarely; caffeine 8-10c qd   • Drug use: Yes     Frequency: 4.0 times per week     Types: Marijuana   • Sexual activity: Defer           Objective   Physical Exam  HEENT exam shows TMs to be clear but oropharynx, spit sclerae nonicteric.  Neck has no adenopathy JVD or bruits.  Lungs are clear.  Heart has regular rate rhythm without murmur gallop her chest is tender to palpation per there is no crepitus or subcu air.  Abdomen is soft nontender.  Extremity exam is unremarkable.  Procedures       KG interpretation shows normal sinus rhythm with no acute ST change    ED Course      No radiology results for the last day              MDM  Number of Diagnoses or Management Options  Diagnosis  management comments: She has findings consistent with vomiting and musculoskeletal chest wall pain.  Patient will be discharged with prescription for Zofran and Naprosyn.  He is a heating pad and see his MD for recheck.    Risk of Complications, Morbidity, and/or Mortality  Presenting problems: high  Diagnostic procedures: high  Management options: high    Patient Progress  Patient progress: stable      Final diagnoses:   Nausea and vomiting, intractability of vomiting not specified, unspecified vomiting type   Chest pain, unspecified type              Saurabh Flowers MD  11/24/19 0874

## 2019-12-03 ENCOUNTER — PRIOR AUTHORIZATION (OUTPATIENT)
Dept: PAIN MEDICINE | Facility: CLINIC | Age: 24
End: 2019-12-03

## 2020-01-06 ENCOUNTER — PRIOR AUTHORIZATION (OUTPATIENT)
Dept: PAIN MEDICINE | Facility: CLINIC | Age: 25
End: 2020-01-06

## 2020-01-06 DIAGNOSIS — G89.29 CHRONIC MIDLINE LOW BACK PAIN WITHOUT SCIATICA: Primary | ICD-10-CM

## 2020-01-06 DIAGNOSIS — M54.50 CHRONIC MIDLINE LOW BACK PAIN WITHOUT SCIATICA: Primary | ICD-10-CM

## 2020-01-06 NOTE — TELEPHONE ENCOUNTER
Insurance would not approve MRI back in December due to no recent PT. So w/o MRI or recent PT,  insurance is not going to approve [facility]. What do you want to do??

## 2020-01-21 ENCOUNTER — APPOINTMENT (OUTPATIENT)
Dept: PAIN MEDICINE | Facility: HOSPITAL | Age: 25
End: 2020-01-21

## 2020-01-22 RX ORDER — OMEPRAZOLE 20 MG/1
CAPSULE, DELAYED RELEASE ORAL
Qty: 60 CAPSULE | Refills: 1 | Status: SHIPPED | OUTPATIENT
Start: 2020-01-22 | End: 2020-04-13

## 2020-04-14 RX ORDER — OMEPRAZOLE 20 MG/1
CAPSULE, DELAYED RELEASE ORAL
Qty: 60 CAPSULE | Refills: 0 | Status: SHIPPED | OUTPATIENT
Start: 2020-04-14 | End: 2020-06-09 | Stop reason: SDUPTHER

## 2020-05-12 ENCOUNTER — HOSPITAL ENCOUNTER (EMERGENCY)
Facility: HOSPITAL | Age: 25
Discharge: HOME | End: 2020-05-12
Attending: EMERGENCY MEDICINE | Admitting: EMERGENCY MEDICINE

## 2020-05-12 ENCOUNTER — APPOINTMENT (OUTPATIENT)
Dept: CT IMAGING | Facility: HOSPITAL | Age: 25
End: 2020-05-12

## 2020-05-12 VITALS
OXYGEN SATURATION: 97 % | HEIGHT: 69 IN | HEART RATE: 70 BPM | WEIGHT: 156.75 LBS | RESPIRATION RATE: 16 BRPM | BODY MASS INDEX: 23.22 KG/M2 | SYSTOLIC BLOOD PRESSURE: 100 MMHG | TEMPERATURE: 98.4 F | DIASTOLIC BLOOD PRESSURE: 77 MMHG

## 2020-05-12 DIAGNOSIS — R56.9 SEIZURE (HCC): Primary | ICD-10-CM

## 2020-05-12 LAB
ANION GAP SERPL CALCULATED.3IONS-SCNC: 14 MMOL/L (ref 5–15)
BASOPHILS # BLD AUTO: 0.1 10*3/MM3 (ref 0–0.2)
BASOPHILS NFR BLD AUTO: 0.7 % (ref 0–1.5)
BUN BLD-MCNC: 19 MG/DL (ref 6–20)
BUN/CREAT SERPL: 15.7 (ref 7–25)
CALCIUM SPEC-SCNC: 10.3 MG/DL (ref 8.6–10.5)
CHLORIDE SERPL-SCNC: 104 MMOL/L (ref 98–107)
CO2 SERPL-SCNC: 25 MMOL/L (ref 22–29)
CREAT BLD-MCNC: 1.21 MG/DL (ref 0.76–1.27)
DEPRECATED RDW RBC AUTO: 42 FL (ref 37–54)
EOSINOPHIL # BLD AUTO: 0.3 10*3/MM3 (ref 0–0.4)
EOSINOPHIL NFR BLD AUTO: 3.2 % (ref 0.3–6.2)
ERYTHROCYTE [DISTWIDTH] IN BLOOD BY AUTOMATED COUNT: 13.2 % (ref 12.3–15.4)
GFR SERPL CREATININE-BSD FRML MDRD: 74 ML/MIN/1.73
GLUCOSE BLD-MCNC: 118 MG/DL (ref 65–99)
HCT VFR BLD AUTO: 44.6 % (ref 37.5–51)
HGB BLD-MCNC: 15.8 G/DL (ref 13–17.7)
LYMPHOCYTES # BLD AUTO: 2.4 10*3/MM3 (ref 0.7–3.1)
LYMPHOCYTES NFR BLD AUTO: 22.3 % (ref 19.6–45.3)
MCH RBC QN AUTO: 32.1 PG (ref 26.6–33)
MCHC RBC AUTO-ENTMCNC: 35.4 G/DL (ref 31.5–35.7)
MCV RBC AUTO: 90.7 FL (ref 79–97)
MONOCYTES # BLD AUTO: 0.9 10*3/MM3 (ref 0.1–0.9)
MONOCYTES NFR BLD AUTO: 8.6 % (ref 5–12)
NEUTROPHILS # BLD AUTO: 7 10*3/MM3 (ref 1.7–7)
NEUTROPHILS NFR BLD AUTO: 65.2 % (ref 42.7–76)
NRBC BLD AUTO-RTO: 0 /100 WBC (ref 0–0.2)
PLATELET # BLD AUTO: 297 10*3/MM3 (ref 140–450)
PMV BLD AUTO: 8.3 FL (ref 6–12)
POTASSIUM BLD-SCNC: 3.5 MMOL/L (ref 3.5–5.2)
RBC # BLD AUTO: 4.92 10*6/MM3 (ref 4.14–5.8)
SODIUM BLD-SCNC: 143 MMOL/L (ref 136–145)
WBC NRBC COR # BLD: 10.8 10*3/MM3 (ref 3.4–10.8)

## 2020-05-12 PROCEDURE — 80048 BASIC METABOLIC PNL TOTAL CA: CPT | Performed by: EMERGENCY MEDICINE

## 2020-05-12 PROCEDURE — 70450 CT HEAD/BRAIN W/O DYE: CPT

## 2020-05-12 PROCEDURE — 99283 EMERGENCY DEPT VISIT LOW MDM: CPT

## 2020-05-12 PROCEDURE — 25010000003 LEVETIRACETAM IN NACL 0.75% 1000 MG/100ML SOLUTION: Performed by: EMERGENCY MEDICINE

## 2020-05-12 PROCEDURE — 85025 COMPLETE CBC W/AUTO DIFF WBC: CPT | Performed by: EMERGENCY MEDICINE

## 2020-05-12 PROCEDURE — 96374 THER/PROPH/DIAG INJ IV PUSH: CPT

## 2020-05-12 RX ORDER — SODIUM CHLORIDE 0.9 % (FLUSH) 0.9 %
10 SYRINGE (ML) INJECTION AS NEEDED
Status: DISCONTINUED | OUTPATIENT
Start: 2020-05-12 | End: 2020-05-12 | Stop reason: HOSPADM

## 2020-05-12 RX ORDER — LEVETIRACETAM 10 MG/ML
1000 INJECTION INTRAVASCULAR ONCE
Status: COMPLETED | OUTPATIENT
Start: 2020-05-12 | End: 2020-05-12

## 2020-05-12 RX ADMIN — LEVETIRACETAM 1000 MG: 10 INJECTION INTRAVENOUS at 19:11

## 2020-05-12 NOTE — ED NOTES
Patient reports he missed his keppra yesterday.  Reports head and neck pain     Lucina Stone RN  05/12/20 1921

## 2020-05-12 NOTE — ED PROVIDER NOTES
Subjective   Chief complaint: Seizure    25-year-old male with a history of seizures who takes Keppra presents after a seizure today.  Patient states this occurred about 30 minutes ago.  It was witnessed by his uncle.  He does not know any specifics as far as how long it lasted or postictal period.  He states he must have hit the back of his head because he has a painful knot on the right posterior aspect of his head.  He denies biting his tongue or any incontinence.  He denies any other injuries.  He states he has not taken his Keppra for a couple days.      History provided by:  Patient      Review of Systems   Constitutional: Negative for fatigue and fever.   HENT: Negative for congestion and sore throat.    Eyes: Negative for pain and redness.   Respiratory: Negative for cough and shortness of breath.    Cardiovascular: Negative for chest pain.   Gastrointestinal: Negative for anal bleeding and vomiting.   Genitourinary: Negative for dysuria.   Musculoskeletal: Negative for back pain.   Skin: Negative for rash.   Neurological: Positive for seizures and headaches.   Psychiatric/Behavioral: Negative for behavioral problems and confusion.       Past Medical History:   Diagnosis Date   • GERD (gastroesophageal reflux disease)    • Low back pain    • Seizures (CMS/HCC)        Allergies   Allergen Reactions   • Tramadol Nausea And Vomiting       No past surgical history on file.    Family History   Problem Relation Age of Onset   • COPD Mother    • VIVIANA disease Father        Social History     Socioeconomic History   • Marital status: Single     Spouse name: Not on file   • Number of children: Not on file   • Years of education: Not on file   • Highest education level: Not on file   Tobacco Use   • Smoking status: Current Every Day Smoker     Packs/day: 1.00     Years: 8.00     Pack years: 8.00     Types: Cigarettes, Electronic Cigarette   • Smokeless tobacco: Never Used   Substance and Sexual Activity   • Alcohol use:  "Yes     Frequency: Monthly or less     Comment: rarely; caffeine 8-10c qd   • Drug use: Yes     Frequency: 4.0 times per week     Types: Marijuana   • Sexual activity: Defer       /83 (BP Location: Left arm, Patient Position: Sitting)   Pulse 102   Temp 98.5 °F (36.9 °C) (Oral)   Resp 14   Ht 175.3 cm (69\")   Wt 71.1 kg (156 lb 12 oz)   SpO2 98%   BMI 23.15 kg/m²       Objective   Physical Exam   Constitutional: He is oriented to person, place, and time. He appears well-developed and well-nourished.   HENT:   Head: Normocephalic.   Small area of tenderness to the right posterior scalp, no laceration   Eyes: Pupils are equal, round, and reactive to light. EOM are normal.   Neck: Normal range of motion. Neck supple.   No C-spine tenderness   Cardiovascular: Normal rate, regular rhythm and normal heart sounds.   Pulmonary/Chest: Effort normal and breath sounds normal. No respiratory distress.   Abdominal: Soft. Bowel sounds are normal. There is no tenderness.   Musculoskeletal: Normal range of motion.   Neurological: He is alert and oriented to person, place, and time.   No focal motor or sensory deficit appreciated   Skin: Skin is warm and dry.   Nursing note and vitals reviewed.      Procedures           ED Course      Results for orders placed or performed during the hospital encounter of 05/12/20   Basic Metabolic Panel   Result Value Ref Range    Glucose 118 (H) 65 - 99 mg/dL    BUN 19 6 - 20 mg/dL    Creatinine 1.21 0.76 - 1.27 mg/dL    Sodium 143 136 - 145 mmol/L    Potassium 3.5 3.5 - 5.2 mmol/L    Chloride 104 98 - 107 mmol/L    CO2 25.0 22.0 - 29.0 mmol/L    Calcium 10.3 8.6 - 10.5 mg/dL    eGFR Non African Amer 74 >60 mL/min/1.73    BUN/Creatinine Ratio 15.7 7.0 - 25.0    Anion Gap 14.0 5.0 - 15.0 mmol/L   CBC Auto Differential   Result Value Ref Range    WBC 10.80 3.40 - 10.80 10*3/mm3    RBC 4.92 4.14 - 5.80 10*6/mm3    Hemoglobin 15.8 13.0 - 17.7 g/dL    Hematocrit 44.6 37.5 - 51.0 %    MCV " 90.7 79.0 - 97.0 fL    MCH 32.1 26.6 - 33.0 pg    MCHC 35.4 31.5 - 35.7 g/dL    RDW 13.2 12.3 - 15.4 %    RDW-SD 42.0 37.0 - 54.0 fl    MPV 8.3 6.0 - 12.0 fL    Platelets 297 140 - 450 10*3/mm3    Neutrophil % 65.2 42.7 - 76.0 %    Lymphocyte % 22.3 19.6 - 45.3 %    Monocyte % 8.6 5.0 - 12.0 %    Eosinophil % 3.2 0.3 - 6.2 %    Basophil % 0.7 0.0 - 1.5 %    Neutrophils, Absolute 7.00 1.70 - 7.00 10*3/mm3    Lymphocytes, Absolute 2.40 0.70 - 3.10 10*3/mm3    Monocytes, Absolute 0.90 0.10 - 0.90 10*3/mm3    Eosinophils, Absolute 0.30 0.00 - 0.40 10*3/mm3    Basophils, Absolute 0.10 0.00 - 0.20 10*3/mm3    nRBC 0.0 0.0 - 0.2 /100 WBC     Ct Head Without Contrast    Result Date: 5/12/2020  No evidence of hemorrhage, mass effect or midline shift. No acute process identified.  Electronically Signed By-Saurabh العراقي On:5/12/2020 7:36 PM This report was finalized on [id number] by  Saurabh العراقي, .                                         MDM   Patient had the above evaluation.  Results were discussed with the patient.  Patient remained well-appearing in the emergency room with no seizure activity.  CT head shows no acute normality.  Blood work was unremarkable.  He was loaded with 1 g of Keppra.  He is stable for discharge.      Final diagnoses:   Seizure (CMS/[facility])            Cesar Medeiros MD  05/12/20 2003

## 2020-05-13 NOTE — DISCHARGE INSTRUCTIONS
Follow-up with your primary doctor.  Return to the emergency room for any new or worsening symptoms or if you have any other questions or concerns.  Take your seizure medicine as previously prescribed.

## 2020-05-31 RX ORDER — OMEPRAZOLE 20 MG/1
CAPSULE, DELAYED RELEASE ORAL
Qty: 60 CAPSULE | Refills: 0 | OUTPATIENT
Start: 2020-05-31

## 2020-06-06 RX ORDER — OMEPRAZOLE 20 MG/1
CAPSULE, DELAYED RELEASE ORAL
Qty: 60 CAPSULE | Refills: 0 | OUTPATIENT
Start: 2020-06-06

## 2020-06-09 RX ORDER — OMEPRAZOLE 20 MG/1
20 CAPSULE, DELAYED RELEASE ORAL 2 TIMES DAILY
Qty: 60 CAPSULE | Refills: 0 | Status: CANCELLED | OUTPATIENT
Start: 2020-06-09

## 2020-06-09 RX ORDER — OMEPRAZOLE 20 MG/1
20 CAPSULE, DELAYED RELEASE ORAL 2 TIMES DAILY
Qty: 60 CAPSULE | Refills: 1 | Status: SHIPPED | OUTPATIENT
Start: 2020-06-09

## 2020-07-23 ENCOUNTER — HOSPITAL ENCOUNTER (EMERGENCY)
Facility: HOSPITAL | Age: 25
Discharge: HOME | End: 2020-07-23
Admitting: EMERGENCY MEDICINE

## 2020-07-23 VITALS
HEIGHT: 68 IN | OXYGEN SATURATION: 98 % | WEIGHT: 158.29 LBS | RESPIRATION RATE: 18 BRPM | BODY MASS INDEX: 23.99 KG/M2 | DIASTOLIC BLOOD PRESSURE: 55 MMHG | SYSTOLIC BLOOD PRESSURE: 118 MMHG | TEMPERATURE: 98 F | HEART RATE: 84 BPM

## 2020-07-23 DIAGNOSIS — M54.50 ACUTE MIDLINE LOW BACK PAIN WITHOUT SCIATICA: Primary | ICD-10-CM

## 2020-07-23 PROCEDURE — 25010000002 DEXAMETHASONE SODIUM PHOSPHATE 10 MG/ML SOLUTION: Performed by: PHYSICIAN ASSISTANT

## 2020-07-23 PROCEDURE — 25010000002 KETOROLAC TROMETHAMINE PER 15 MG: Performed by: PHYSICIAN ASSISTANT

## 2020-07-23 PROCEDURE — 96372 THER/PROPH/DIAG INJ SC/IM: CPT

## 2020-07-23 PROCEDURE — 99283 EMERGENCY DEPT VISIT LOW MDM: CPT

## 2020-07-23 RX ORDER — METHOCARBAMOL 750 MG/1
750 TABLET, FILM COATED ORAL ONCE
Status: COMPLETED | OUTPATIENT
Start: 2020-07-23 | End: 2020-07-23

## 2020-07-23 RX ORDER — METHOCARBAMOL 750 MG/1
750 TABLET, FILM COATED ORAL 3 TIMES DAILY PRN
Qty: 12 TABLET | Refills: 0 | Status: SHIPPED | OUTPATIENT
Start: 2020-07-23 | End: 2020-07-28

## 2020-07-23 RX ORDER — LIDOCAINE 50 MG/G
1 PATCH TOPICAL ONCE
Status: DISCONTINUED | OUTPATIENT
Start: 2020-07-23 | End: 2020-07-23 | Stop reason: HOSPADM

## 2020-07-23 RX ORDER — LIDOCAINE 50 MG/G
1 PATCH TOPICAL EVERY 24 HOURS
Qty: 30 PATCH | Refills: 0 | Status: SHIPPED | OUTPATIENT
Start: 2020-07-23 | End: 2020-07-28

## 2020-07-23 RX ORDER — KETOROLAC TROMETHAMINE 30 MG/ML
30 INJECTION, SOLUTION INTRAMUSCULAR; INTRAVENOUS ONCE
Status: COMPLETED | OUTPATIENT
Start: 2020-07-23 | End: 2020-07-23

## 2020-07-23 RX ORDER — METHYLPREDNISOLONE 4 MG/1
TABLET ORAL
Qty: 21 TABLET | Refills: 0 | Status: SHIPPED | OUTPATIENT
Start: 2020-07-23 | End: 2020-07-28

## 2020-07-23 RX ORDER — NAPROXEN 375 MG/1
375 TABLET ORAL 2 TIMES DAILY PRN
Qty: 14 TABLET | Refills: 0 | Status: SHIPPED | OUTPATIENT
Start: 2020-07-23 | End: 2020-07-28

## 2020-07-23 RX ORDER — DEXAMETHASONE SODIUM PHOSPHATE 10 MG/ML
8 INJECTION, SOLUTION INTRAMUSCULAR; INTRAVENOUS ONCE
Status: COMPLETED | OUTPATIENT
Start: 2020-07-23 | End: 2020-07-23

## 2020-07-23 RX ORDER — METHYLPREDNISOLONE SODIUM SUCCINATE 125 MG/2ML
125 INJECTION, POWDER, LYOPHILIZED, FOR SOLUTION INTRAMUSCULAR; INTRAVENOUS ONCE
Status: DISCONTINUED | OUTPATIENT
Start: 2020-07-23 | End: 2020-07-23

## 2020-07-23 RX ADMIN — KETOROLAC TROMETHAMINE 30 MG: 30 INJECTION, SOLUTION INTRAMUSCULAR at 12:06

## 2020-07-23 RX ADMIN — METHOCARBAMOL TABLETS 750 MG: 750 TABLET, COATED ORAL at 12:06

## 2020-07-23 RX ADMIN — LIDOCAINE 1 PATCH: 50 PATCH TOPICAL at 12:05

## 2020-07-23 RX ADMIN — DEXAMETHASONE SODIUM PHOSPHATE 8 MG: 10 INJECTION, SOLUTION INTRAMUSCULAR; INTRAVENOUS at 12:06

## 2020-07-23 NOTE — DISCHARGE INSTRUCTIONS
Take Robaxin and naproxen as needed for pain.  Do not mix naproxen with other NSAID such as ibuprofen diclofenac or Aleve.  You may also use lidocaine patches to low back as needed for pain.  Do not apply heating pad over this patch.  Take Medrol Dosepak as prescribed.    Follow-up with your primary care provider in 3-5 days.  If you do not have a primary care provider call [phone] for help in finding one, or you may follow up with [facility] at [phone].    Return to ED for any new or worsening symptoms

## 2020-07-23 NOTE — ED PROVIDER NOTES
Subjective   Patient is a 25-year-old male [facility] significant for seizures, GERD, chronic low back pain who presents with complaints of midline low back pain since last night.  Patient states it started when he was watching TV in bed.  Denies any injury to it but does have a long history of chronic low back pain.  Patient describes his pain as a constant achy type pain with intermittent sharp shooting pain that radiates to his left hip at times.  Currently rates his pain an 9/10 severity.  Patient denies any saddle anesthesia, bladder incontinence, fever, history of chemoradiation, or history of IV drug use.  Patient states he has had x-rays done on his back back in 2019 he was also scheduled for physical therapy at that time but did not go. he also was seeing pain management at that time but refused epidural injections for his pain because he does not like needles.  Patient denies any abdominal pain chest pain or shortness of breath.  Patient states the pain is worse with lying flat and certain movements or with rest denies any other alleviating or exacerbating factors.          Review of Systems   Constitutional: Negative.    Respiratory: Negative.    Cardiovascular: Negative.    Gastrointestinal: Negative for abdominal distention, abdominal pain, nausea and vomiting.   Genitourinary: Negative.    Musculoskeletal: Positive for back pain. Negative for gait problem, neck pain and neck stiffness.   Skin: Negative.    Neurological: Negative.        Past Medical History:   Diagnosis Date   • GERD (gastroesophageal reflux disease)    • Low back pain    • Seizures (CMS/HCC)        Allergies   Allergen Reactions   • Tramadol Nausea And Vomiting       No past surgical history on file.    Family History   Problem Relation Age of Onset   • COPD Mother    • VIVIANA disease Father        Social History     Socioeconomic History   • Marital status: Single     Spouse name: Not on file   • Number of children: Not on file   • Years of  education: Not on file   • Highest education level: Not on file   Tobacco Use   • Smoking status: Current Every Day Smoker     Packs/day: 1.00     Years: 8.00     Pack years: 8.00     Types: Cigarettes, Electronic Cigarette   • Smokeless tobacco: Never Used   Substance and Sexual Activity   • Alcohol use: Yes     Frequency: Monthly or less     Comment: rarely; caffeine 8-10c qd   • Drug use: Yes     Frequency: 4.0 times per week     Types: Marijuana   • Sexual activity: Defer           Objective   Physical Exam   Constitutional: He is oriented to person, place, and time. He appears well-developed and well-nourished. No distress.   HENT:   Head: Normocephalic and atraumatic.   Mouth/Throat: Oropharynx is clear and moist.   Eyes: Pupils are equal, round, and reactive to light. EOM are normal. No scleral icterus.   Cardiovascular: Normal rate, regular rhythm, normal heart sounds and intact distal pulses. Exam reveals no gallop and no friction rub.   No murmur heard.  Pulmonary/Chest: Effort normal and breath sounds normal. No stridor. He has no wheezes. He has no rales. He exhibits no tenderness.   Musculoskeletal:   Back:  Cervical, thoracic, lumbar spine or midline with lumbar midline tenderness. No step-offs,.  Spinal musculature soft, nontender, no palpable mass spasm, no overlying erythema, no ecchymosis. Range of motion is present but decreased secondary to pain with increased discomfort noted performing distal muscle strength is 5/5.  Negative straight leg raise       Neurological: He is alert and oriented to person, place, and time. No cranial nerve deficit or sensory deficit.   Neuro:  Alert orient x3 speech is clear and appropriate.  Negative straight leg raise BLE, strong and equal dorsal flexion of the bilateral great toes L4, L5, S1 motor sensory intact, ambulatory with upright and steady gait.   Skin: Skin is warm. Capillary refill takes less than 2 seconds. No rash noted. He is not diaphoretic. No  "erythema. No pallor.   Psychiatric: He has a normal mood and affect. His behavior is normal.   Nursing note and vitals reviewed.      Procedures           ED Course    Blood pressure 118/55, pulse 96, temperature 98.6 °F (37 °C), temperature source Oral, resp. rate 14, height 172.7 cm (68\"), weight 71.8 kg (158 lb 4.6 oz), SpO2 98 %.    Medications   lidocaine (LIDODERM) 5 % 1 patch (1 patch Transdermal Medication Applied 7/23/20 1205)   ketorolac (TORADOL) injection 30 mg (30 mg Intramuscular Given 7/23/20 1206)   methocarbamol (ROBAXIN) tablet 750 mg (750 mg Oral Given 7/23/20 1206)   dexamethasone sodium phosphate injection 8 mg (8 mg Intramuscular Given 7/23/20 1206)                                            MDM  Number of Diagnoses or Management Options  Acute midline low back pain without sciatica:   Diagnosis management comments: Chart Review:  Comorbidity: Seizures, GERD, chronic back pain  Differentials: Cauda equina, epidural abscess, disc herniation, sciatica, muscle skeletal strain      ;this list is not all inclusive and does not constitute the entirety of considered causes  Imaging: Was interpreted by physician and reviewed by myself:   DATE OF EXAM:  10/13/2019 12:57 PM  PROCEDURE:  XR SPINE LUMBAR 4+ VW-     INDICATIONS:  low back pain  COMPARISON:  Lumbar spine radiographs 4/25/2018. CT abdomen and pelvis 2/8/2018.  TECHNIQUE:   A complete lumbar spine with a minimum of four radiologic views were  obtained.  FINDINGS:  The lumbar vertebral bodies demonstrate well-preserved height and  alignment. No evidence of acute fracture or subluxation of the lumbar  spine. Stable intervertebral disc space narrowing at L5-S1. The SI  joints are unremarkable.      IMPRESSION:  1. No radiographic evidence of acute fracture or subluxation.  2. Stable disc space narrowing at L5-S1.  Electronically Signed By-Clem Ragland On:10/13/2019 1:33 PM  This report was finalized on [id number] by  Clem Ragland, " .    Disposition/Treatment:  Appropriate PPE was worn during exam and throughout all encounters with the patient.  While in the ED patient was given Decadron Toradol Robaxin lidocaine patch for his pain he was ambulatory with steady gait. there are no signs of severe infection patient has had no new injury to the the area does have a chronic history of back pain in this region.  Patient's pain is likely flareup of his chronic back pain.  No new injury has occurred to warrant new imaging at this time.  X-ray of lumbar spine was reviewed as above from 2019.  Upon reassessment patient is resting quietly does report improvement of his pain.  Patient will be given lidocaine patches Robaxin naproxen and Medrol Dosepak for home.  Findings were discussed patient voiced understanding discharge along with signs symptoms return to the ED.  Patient was stable time of discharge and in agreement with plan ambulatory with an upright steady gait.      Final diagnoses:   Acute midline low back pain without sciatica            Danae Sparrow PA  07/23/20 3254

## 2020-07-23 NOTE — ED NOTES
Pt c/o back pain from an injury 1 month ago.   Pt sts he has chronic back pain and pain management wanted to do epidural but he doesn't like needles.       Laura Tomlinson, LPN  07/23/20 1140

## 2020-07-27 ENCOUNTER — OFFICE VISIT (OUTPATIENT)
Dept: FAMILY MEDICINE CLINIC | Facility: CLINIC | Age: 25
End: 2020-07-27

## 2020-07-27 VITALS
BODY MASS INDEX: 23.87 KG/M2 | OXYGEN SATURATION: 97 % | SYSTOLIC BLOOD PRESSURE: 152 MMHG | TEMPERATURE: 98.7 F | WEIGHT: 157 LBS | DIASTOLIC BLOOD PRESSURE: 98 MMHG | HEART RATE: 72 BPM

## 2020-07-27 DIAGNOSIS — M54.50 CHRONIC MIDLINE LOW BACK PAIN WITHOUT SCIATICA: Primary | ICD-10-CM

## 2020-07-27 DIAGNOSIS — G89.29 CHRONIC MIDLINE LOW BACK PAIN WITHOUT SCIATICA: Primary | ICD-10-CM

## 2020-07-27 PROCEDURE — 99213 OFFICE O/P EST LOW 20 MIN: CPT | Performed by: FAMILY MEDICINE

## 2020-07-27 NOTE — PROGRESS NOTES
Subjective   Chief Complaint   Patient presents with   • Back Pain     ER f/u, med not helping, hx of back pain, needs a specialist     Lee Peck is a 25 y.o. male.     No recent injury but he had football injuries as a teenager. Trouble sleeping due to pain.  He was in the ER recently.  He is trying to work in construction. He has been referred to Pain Management by this office in the past but says he never was able to get in.      Back Pain   This is a recurrent problem. The current episode started more than 1 year ago. The problem occurs constantly. The problem has been gradually worsening since onset. The pain is present in the lumbar spine. The quality of the pain is described as aching. The pain does not radiate. The pain is severe. The symptoms are aggravated by position. Pertinent negatives include no abdominal pain, chest pain or fever. He has tried NSAIDs, muscle relaxant, analgesics, bed rest and ice (gabapentin, Lyrica, diclofenac, Tylenol, steroids, amitriptyline, meloxicam, Cymbalta) for the symptoms. The treatment provided no relief.      Past Medical History:   Diagnosis Date   • GERD (gastroesophageal reflux disease)    • Low back pain    • Seizures (CMS/HCC)      History reviewed. No pertinent surgical history.  Allergies   Allergen Reactions   • Tramadol Nausea And Vomiting     Social History     Socioeconomic History   • Marital status: Single     Spouse name: Not on file   • Number of children: Not on file   • Years of education: Not on file   • Highest education level: Not on file   Tobacco Use   • Smoking status: Current Every Day Smoker     Packs/day: 1.00     Years: 8.00     Pack years: 8.00     Types: Cigarettes, Electronic Cigarette   • Smokeless tobacco: Never Used   Substance and Sexual Activity   • Alcohol use: Yes     Frequency: Monthly or less     Comment: rarely; caffeine 8-10c qd   • Drug use: Yes     Frequency: 4.0 times per week     Types: Marijuana   • Sexual activity: Defer      Social History     Tobacco Use   Smoking Status Current Every Day Smoker   • Packs/day: 1.00   • Years: 8.00   • Pack years: 8.00   • Types: Cigarettes, Electronic Cigarette   Smokeless Tobacco Never Used       family history includes COPD in his mother; VIVIANA disease in his father.  Current Outpatient Medications on File Prior to Visit   Medication Sig Dispense Refill   • levETIRAcetam (KEPPRA) 500 MG tablet Take 500 mg by mouth 2 (Two) Times a Day.     • lidocaine (LIDODERM) 5 % Place 1 patch on the skin as directed by provider Daily. Remove & Discard patch within 12 hours or as directed by MD 30 patch 0   • methocarbamol (ROBAXIN) 750 MG tablet Take 1 tablet by mouth 3 (Three) Times a Day As Needed for Muscle Spasms. 12 tablet 0   • methylPREDNISolone (MEDROL, YANIRA,) 4 MG tablet Take as directed on package instructions. 21 tablet 0   • naproxen (NAPROSYN) 375 MG tablet Take 1 tablet by mouth 2 (Two) Times a Day As Needed for Mild Pain . 14 tablet 0   • omeprazole (priLOSEC) 20 MG capsule Take 1 capsule by mouth 2 (Two) Times a Day. 60 capsule 1   • [DISCONTINUED] acetaminophen-codeine (TYLENOL #3) 300-30 MG per tablet Take 1 tablet by mouth Every 6 (Six) Hours As Needed for Moderate Pain . 8 tablet 0   • [DISCONTINUED] diclofenac (FLECTOR) 1.3 % patch patch Apply 1 patch topically to the appropriate area as directed 2 (Two) Times a Day. Remove after 12 hours 60 patch 2   • [DISCONTINUED] ondansetron ODT (ZOFRAN-ODT) 4 MG disintegrating tablet Take 1 tablet by mouth Every 8 (Eight) Hours As Needed for Nausea or Vomiting. 12 tablet 0     No current facility-administered medications on file prior to visit.      Patient Active Problem List   Diagnosis   • Epilepsy without status epilepticus, not intractable (CMS/[facility])   • Low back pain   • Gastroesophageal reflux disease without esophagitis   • Lumbar radiculopathy   • DDD (degenerative disc disease), lumbar       The following portions of the patient's history were  reviewed and updated as appropriate: allergies, current medications, past family history, past medical history, past social history, past surgical history and problem list.    Review of Systems   Constitutional: Negative for chills and fever.   HENT: Negative for sinus pressure and sore throat.    Eyes: Negative for blurred vision.   Respiratory: Negative for cough and shortness of breath.    Cardiovascular: Negative for chest pain and palpitations.   Gastrointestinal: Negative for abdominal pain.   Endocrine: Negative for polyuria.   Musculoskeletal: Positive for back pain.   Skin: Negative for rash.   Neurological: Negative for dizziness and headache.   Hematological: Negative for adenopathy.   Psychiatric/Behavioral: Negative for depressed mood.       Objective   /98 (BP Location: Left arm, Patient Position: Sitting, Cuff Size: Adult)   Pulse 72   Temp 98.7 °F (37.1 °C)   Wt 71.2 kg (157 lb)   SpO2 97%   BMI 23.87 kg/m²   Physical Exam   Constitutional: He appears well-developed and well-nourished.   Pulmonary/Chest: Effort normal.   Musculoskeletal:   Patient left before exam was able to be completed.   Neurological: He is alert.   Psychiatric: He has a normal mood and affect.       No visits with results within 1 Week(s) from this visit.   Latest known visit with results is:   Admission on 05/12/2020, Discharged on 05/12/2020   Component Date Value Ref Range Status   • Glucose 05/12/2020 118* 65 - 99 mg/dL Final   • BUN 05/12/2020 19  6 - 20 mg/dL Final   • Creatinine 05/12/2020 1.21  0.76 - 1.27 mg/dL Final   • Sodium 05/12/2020 143  136 - 145 mmol/L Final   • Potassium 05/12/2020 3.5  3.5 - 5.2 mmol/L Final   • Chloride 05/12/2020 104  98 - 107 mmol/L Final   • CO2 05/12/2020 25.0  22.0 - 29.0 mmol/L Final   • Calcium 05/12/2020 10.3  8.6 - 10.5 mg/dL Final   • eGFR Non African Amer 05/12/2020 74  >60 mL/min/1.73 Final   • BUN/Creatinine Ratio 05/12/2020 15.7  7.0 - 25.0 Final   • Anion Gap  05/12/2020 14.0  5.0 - 15.0 mmol/L Final   • WBC 05/12/2020 10.80  3.40 - 10.80 10*3/mm3 Final   • RBC 05/12/2020 4.92  4.14 - 5.80 10*6/mm3 Final   • Hemoglobin 05/12/2020 15.8  13.0 - 17.7 g/dL Final   • Hematocrit 05/12/2020 44.6  37.5 - 51.0 % Final   • MCV 05/12/2020 90.7  79.0 - 97.0 fL Final   • MCH 05/12/2020 32.1  26.6 - 33.0 pg Final   • MCHC 05/12/2020 35.4  31.5 - 35.7 g/dL Final   • RDW 05/12/2020 13.2  12.3 - 15.4 % Final   • RDW-SD 05/12/2020 42.0  37.0 - 54.0 fl Final   • MPV 05/12/2020 8.3  6.0 - 12.0 fL Final   • Platelets 05/12/2020 297  140 - 450 10*3/mm3 Final   • Neutrophil % 05/12/2020 65.2  42.7 - 76.0 % Final   • Lymphocyte % 05/12/2020 22.3  19.6 - 45.3 % Final   • Monocyte % 05/12/2020 8.6  5.0 - 12.0 % Final   • Eosinophil % 05/12/2020 3.2  0.3 - 6.2 % Final   • Basophil % 05/12/2020 0.7  0.0 - 1.5 % Final   • Neutrophils, Absolute 05/12/2020 7.00  1.70 - 7.00 10*3/mm3 Final   • Lymphocytes, Absolute 05/12/2020 2.40  0.70 - 3.10 10*3/mm3 Final   • Monocytes, Absolute 05/12/2020 0.90  0.10 - 0.90 10*3/mm3 Final   • Eosinophils, Absolute 05/12/2020 0.30  0.00 - 0.40 10*3/mm3 Final   • Basophils, Absolute 05/12/2020 0.10  0.00 - 0.20 10*3/mm3 Final   • nRBC 05/12/2020 0.0  0.0 - 0.2 /100 WBC Final           Assessment/Plan   Problems Addressed this Visit        Nervous and Auditory    Low back pain - Primary     I offered to refer patient to Pain Management.  He wants something for pain to last until then.  I told him that I could offer him NSAIDs, steroid, muscle relaxers, gabapentin, Lyrica, Elavil, etc.  Every medicine that I offered he says he has already tried and has had no improvement.  I told him that I am unwilling to prescribe narcotics for him. He left stating that no one is willing to help him.          Relevant Orders    Ambulatory Referral to Pain Management

## 2020-07-27 NOTE — ASSESSMENT & PLAN NOTE
I offered to refer patient to Pain Management.  He wants something for pain to last until then.  I told him that I could offer him NSAIDs, steroid, muscle relaxers, gabapentin, Lyrica, Elavil, etc.  Every medicine that I offered he says he has already tried and has had no improvement.  I told him that I am unwilling to prescribe narcotics for him. He left stating that no one is willing to help him.

## 2020-07-28 ENCOUNTER — OFFICE VISIT (OUTPATIENT)
Dept: ORTHOPEDIC SURGERY | Facility: CLINIC | Age: 25
End: 2020-07-28

## 2020-07-28 VITALS
HEIGHT: 68 IN | WEIGHT: 159 LBS | SYSTOLIC BLOOD PRESSURE: 122 MMHG | DIASTOLIC BLOOD PRESSURE: 88 MMHG | BODY MASS INDEX: 24.1 KG/M2 | HEART RATE: 82 BPM

## 2020-07-28 DIAGNOSIS — M54.50 ACUTE LOW BACK PAIN WITHOUT SCIATICA, UNSPECIFIED BACK PAIN LATERALITY: Primary | ICD-10-CM

## 2020-07-28 DIAGNOSIS — M51.36 LUMBAR DEGENERATIVE DISC DISEASE: ICD-10-CM

## 2020-07-28 PROCEDURE — 99213 OFFICE O/P EST LOW 20 MIN: CPT | Performed by: FAMILY MEDICINE

## 2020-07-28 RX ORDER — MELOXICAM 15 MG/1
15 TABLET ORAL DAILY PRN
Qty: 30 TABLET | Refills: 4 | Status: SHIPPED | OUTPATIENT
Start: 2020-07-28 | End: 2021-07-21

## 2020-07-28 RX ORDER — CYCLOBENZAPRINE HCL 10 MG
10 TABLET ORAL NIGHTLY PRN
Qty: 30 TABLET | Refills: 0 | Status: SHIPPED | OUTPATIENT
Start: 2020-07-28 | End: 2021-07-21

## 2020-07-28 NOTE — PROGRESS NOTES
"Primary Care Sports Medicine Office Visit Note     Patient ID: Lee Peck is a 25 y.o. male.    Chief Complaint:  Chief Complaint   Patient presents with   • Lumbar Spine - Consult     HPI:    Mr. Lee Peck is a 25 y.o. male who presents to the clinic today for low back pain. Pt states that he had an injury playing football a few years ago. He was playing tackle football in the yard, and was tackled with his hands overhead catching the ball. Another player's shoulder into his ribs/lateral chest wall. Felt a \"pop in my back\" at that time. Has seen his PCP about this, has attempted muscle relaxer, naproxen, lidocaine patches, steroid dose pack. Has been diagnosed with DDD in the lumbar spine. Has attempted PT for 4-5 visits, but felt this made things worse instead of better. Seizure disorder as well, well controlled on keppra. Has also attempted norco 5 mg from his grandfather, that works well. Has seen pain management as well.     Past Medical History:   Diagnosis Date   • GERD (gastroesophageal reflux disease)    • Low back pain    • Seizures (CMS/HCC)        History reviewed. No pertinent surgical history.    Family History   Problem Relation Age of Onset   • COPD Mother    • VIVIANA disease Father      Social History     Occupational History   • Not on file   Tobacco Use   • Smoking status: Current Every Day Smoker     Packs/day: 1.00     Years: 8.00     Pack years: 8.00     Types: Cigarettes, Electronic Cigarette   • Smokeless tobacco: Never Used   Substance and Sexual Activity   • Alcohol use: Yes     Frequency: Monthly or less     Comment: rarely; caffeine 8-10c qd   • Drug use: Yes     Frequency: 4.0 times per week     Types: Marijuana   • Sexual activity: Defer      Review of Systems   Constitutional: Negative for activity change and fever.   Respiratory: Negative for cough and shortness of breath.    Cardiovascular: Negative for chest pain.   Gastrointestinal: Negative for constipation, diarrhea, nausea " "and vomiting.   Musculoskeletal: Positive for arthralgias.   Skin: Negative for color change and rash.   Neurological: Negative for weakness.   Hematological: Does not bruise/bleed easily.       Objective:    /88   Pulse 82   Ht 172.7 cm (68\")   Wt 72.1 kg (159 lb)   BMI 24.18 kg/m²     Physical Examination:  Physical Exam   Constitutional: He appears well-developed and well-nourished. No distress.   HENT:   Head: Normocephalic and atraumatic.   Eyes: Conjunctivae are normal.   Cardiovascular: Intact distal pulses.   Pulmonary/Chest: Effort normal. No respiratory distress.   Neurological: He is alert.   Skin: Skin is warm. Capillary refill takes less than 2 seconds. He is not diaphoretic.   Nursing note and vitals reviewed.    Left Knee Exam     Range of Motion   The patient has normal left knee ROM.      Back Exam     Tenderness   The patient is experiencing no tenderness.     Range of Motion   The patient has normal back ROM.    Muscle Strength   The patient has normal back strength.  Right Quadriceps:  5/5   Right Hamstrings:  5/5     Tests   Straight leg raise left: negative    Reflexes   Patellar: normal    Other   Sensation: normal  Erythema: no back redness        Imaging and other tests:  XR Lumbar spine today shows stable narrowing of L5 -S1 disc space.     Assessment and Plan:    1. Acute low back pain without sciatica, unspecified back pain laterality  - XR Spine Lumbar 2 or 3 View    2. Lumbar degenerative disc disease  - meloxicam (MOBIC) 15 MG tablet; Take 1 tablet by mouth Daily As Needed for Mild Pain .  Dispense: 30 tablet; Refill: 4  - cyclobenzaprine (FLEXERIL) 10 MG tablet; Take 1 tablet by mouth At Night As Needed for Muscle Spasms.  Dispense: 30 tablet; Refill: 0  - Ambulatory Referral to Physical Therapy Evaluate and treat, Ortho; Stretching, ROM, Strengthening    Will attempt PT and conservative management first. Could consider MRI. RTC in 2-4 weeks If no improvement.     Samuel CARDENAS" "\"Chance\" Jasen KITCHEN DO, CAQSM  07/28/20  11:10    Disclaimer: Please note that areas of this note were completed with computer voice recognition software.  Quite often unanticipated grammatical, syntax, homophones, and other interpretive errors are inadvertently transcribed by the computer software. Please excuse any errors that have escaped final proofreading.  "

## 2020-07-29 ENCOUNTER — TREATMENT (OUTPATIENT)
Dept: PHYSICAL THERAPY | Facility: CLINIC | Age: 25
End: 2020-07-29

## 2020-07-29 DIAGNOSIS — M51.36 DDD (DEGENERATIVE DISC DISEASE), LUMBAR: Primary | ICD-10-CM

## 2020-07-29 DIAGNOSIS — M51.36 LUMBAR DEGENERATIVE DISC DISEASE: ICD-10-CM

## 2020-07-29 PROCEDURE — 97162 PT EVAL MOD COMPLEX 30 MIN: CPT | Performed by: PHYSICAL THERAPIST

## 2020-07-29 PROCEDURE — 97530 THERAPEUTIC ACTIVITIES: CPT | Performed by: PHYSICAL THERAPIST

## 2020-07-29 NOTE — PROGRESS NOTES
Physical Therapy Initial Evaluation and Plan of Care    Patient: Lee Peck   : 1995  Diagnosis/ICD-10 Code:  DDD (degenerative disc disease), lumbar [M51.36]  Referring practitioner: Samuel Aggarwal I*  Date of Initial Visit: 2020  Today's Date: 2020  Patient seen for 1 sessions           Subjective Questionnaire: Oswestry: 27/50 = 54% disability      Subjective Evaluation    History of Present Illness  Mechanism of injury: Has had LBP for 8 years since a foot ball injury. Getting worse with bending, sitting longer, riding in the car... Has trouble sleeping, worse at night...  Always LBP, denies T/N pain or weakness in the legs.   Got muscle relaxer's but they do not help.   Has trouble bending over to put on socks and shoes, has wife doing it at times. Feels like he has a block.   Had X -ray yesterday: normal.   Hx. [place]izures [place], has not had one in 4 years., Has trouble reading and writing did not finish school.         Subjective comment: 24 y/o ref. with lumbar DDD  Patient Occupation: Can not work due to BP and Covid.   Precautions and Work Restrictions: No.Pain  Current pain ratin (central LBP, in sitting)  At best pain rating: 3 (central LBP, sometimes while lying down)  At worst pain rating: 10 (trying to sleep at night, has to get up and move... )  Quality: feels like a knife stabbing in central LB.   Exacerbated by: bending, sitting longer, riding in car, trying to go to sleep lying down at night.     Social Support  Lives in: one-story house (goes slow on step.)  Lives with: spouse and young children (9 year old daughter. )    Hand dominance: right    Diagnostic Tests  X-ray: normal    Treatments  Previous treatment: medication (muscle relaxer's)  Patient Goals  Patient goals for therapy: decreased pain, increased motion, increased strength, independence with ADLs/IADLs, return to sport/leisure activities and return to work  Patient goal: Be able to ride bike with  daughter, be able to sleep, go back to work.            Objective          Postural Observations  Seated posture: poor (sitting hunched elbows on knees)  Standing posture: fair  Correction of posture: makes symptoms worse (trial sitting with lumbar roll)        Active Range of Motion     Lumbar   Flexion: 65 (worse during ) degrees with pain  Extension: 45 (worse during) degrees with pain  Left lateral flexion: 35 degrees with pain  Right lateral flexion: 35 (both L and R side glide worse central LBP during. ) degrees with pain    Additional Active Range of Motion Details  In sitting; 5/10 central LBP, worse when trying to sit with lumbar roll.  After AROM lumbar testing in standing: pain up to 8/10.  Tender to touch throughout lumbar spine.  Both legs normal sensation to light touch.   MMT both legs 5/5  R leg SLR (+) at 45, left(-) in supine.  Today: best in supine: pain down to 3/10 while in R side side glide.  Prone also better in R side glide down from 5 to 4/10.  Left side lying head on pillow mat up a little better than flat down to 5/10 central LBP.  Did trial prone in R side glide for IF/ES, patient refused ice or MHP, could only tolerated intensity to 3. Did not want to continue.  After reported 10/10 LBP.  Was able to get up and sit on edge of mat.  Did trial slouch over correct before sit to stand: patient stated it hurt worse but he was able to stand up and walk out.   HEP: sit less, when sitting sit better, rest arms on table or pillows, avoid sitting with elbows on knees. Walk more 3x day 10 to 15 min. Rest lying down 5 to 10 min every 2 hours. Try R side glide to reduce LBP.           Assessment & Plan     Assessment  Impairments: abnormal gait, abnormal or restricted ROM, activity intolerance, lacks appropriate home exercise program and pain with function  Assessment details: 24 y/o w/m with 8 year hx. Chronic central LBP. Very irritable, pain with all AROM, in sitting, standing and lying down.  Unloaded lying supine in R side glide best. Patient will benefit from skilled PT. If patient does not tolerate land PT may try Aquatic.   Barriers to therapy: Hx. Seizures, GERD, leg fx.  Prognosis: good  Functional Limitations: lifting, sleeping, walking, pulling, pushing, uncomfortable because of pain, moving in bed, sitting, standing and stooping  Goals  Plan Goals: Pt presents to PT with symptoms consistent with central LBP. Pt would benefit from skilled PT intervention to address the deficits noted.      SHORT TERM GOALS: Time for goal achievement:  3 weeks  1. Pt will be able to demonstrate initial HEP.  2. Pt reports LBP at least 50% better.  3. Pt can demo safe body mechanics.  4. Pt can tolerate core strength ex.  5. If patient does not tolerate Land PT will trial Aquatic for pain relief/unloading.    LONG TERM GOALS: Time for goal achievement: 3 months  1. Pt to score < 30% on Modified Oswestry score.  2. Pt reports he is ready for DC to [facility] for self management of his condition.   3. Pt has 70% improved AROM lumbar in standing.   4. Patient can now ride his bike with his child.    Plan  Therapy options: will be seen for skilled physical therapy services  Planned modality interventions: cryotherapy, electrical stimulation/Russian stimulation, TENS, thermotherapy (hydrocollator packs), traction and ultrasound  Other planned modality interventions: AQUATIC PT  Planned therapy interventions: abdominal trunk stabilization, body mechanics training, gait training, home exercise program, manual therapy, neuromuscular re-education, soft tissue mobilization, spinal/joint mobilization, strengthening, stretching and therapeutic activities  Frequency: 2x week  Duration in visits: 12  Duration in weeks: 8  Treatment plan discussed with: patient        Timed:         Manual Therapy:         mins  63854;     Therapeutic Exercise:         mins  79970;     Neuromuscular Muna:        mins  24537;    Therapeutic  Activity:    15      mins  18895;     Gait Training:           mins  45786;     Ultrasound:          mins  44628;    Ionto                                   mins   37955  Self Care                            mins   61998  Aquatic                               mins 75399      Un-Timed:  Electrical Stimulation:         mins  60935 ( );  Dry Needling          mins self-pay  Traction          mins 03546  Low Eval          Mins  26803  Mod Eval     30     Mins  65929  High Eval                            Mins  81401  Re-Eval                               mins  71316        Timed Treatment:  15   mins   Total Treatment:     45   mins    PT SIGNATURE: Manisha Yi, ISADORA   DATE TREATMENT INITIATED: 7/29/2020    Initial Certification  Certification Period: 10/27/2020  I certify that the therapy services are furnished while this patient is under my care.  The services outlined above are required by this patient, and will be reviewed every 90 days.     PHYSICIAN: Samuel Aggarwal II, DO      DATE:     Please sign and return via fax to [phone].. Thank you, [facility].

## 2020-07-30 ENCOUNTER — TELEPHONE (OUTPATIENT)
Dept: ORTHOPEDIC SURGERY | Facility: CLINIC | Age: 25
End: 2020-07-30

## 2020-07-30 NOTE — TELEPHONE ENCOUNTER
Voicemail from patient's mother stating that the patient's been taking Meloxicam and it is not working and requesting a different medication. Patient mother states he is allergic to Tramadol. Please advise. Thank you.

## 2020-07-31 NOTE — TELEPHONE ENCOUNTER
Call placed to patient, advised as per Dr. Aggarwal. Patient states he already has muscle relaxer's and the meloxicam does not work. Advised that Dr. Aggarwal would not prescribe pain medication. Patient then handed phone to his mother. Advised mother regarding the muscle relaxer's. Pt mother states the same that he has the muscle relaxer's and meloxicam. Again advised that Dr. Aggarwal does not prescribe pain medication and that if the Meloxicam was not working he could try OTC Tylenol or ibuprofen. Okay per pt mother.

## 2020-07-31 NOTE — TELEPHONE ENCOUNTER
Please call the pt and let them know if he would like we can add a muscle relaxer. That would likely help with muscle spasm and pain. Let me know, and Ill send in flexeril.

## 2020-08-03 ENCOUNTER — TREATMENT (OUTPATIENT)
Dept: PHYSICAL THERAPY | Facility: CLINIC | Age: 25
End: 2020-08-03

## 2020-08-03 DIAGNOSIS — M51.36 DDD (DEGENERATIVE DISC DISEASE), LUMBAR: Primary | ICD-10-CM

## 2020-08-03 PROCEDURE — 97110 THERAPEUTIC EXERCISES: CPT | Performed by: PHYSICAL THERAPIST

## 2020-08-03 PROCEDURE — 97530 THERAPEUTIC ACTIVITIES: CPT | Performed by: PHYSICAL THERAPIST

## 2020-08-03 NOTE — PROGRESS NOTES
Physical Therapy Daily Progress Note  VISIT#: 2 POC 12    Subjective   Lee Peck reports: has been sitting less, better, walking more. Walked to grocery store yesterday. Last night LBP moved to upper back, when he tried shifting to side glide. When he straightened back out pain moved back down to low back.  Came in with 3/10 LBP today.       Objective     See Exercise, Manual, and Modality Logs for complete treatment.     Patient Education: practiced HEP.   Set baseline for walking on TM with arm swing. Was able to do 10 min. Was SOA and LBP up to 5/10.  Practiced safe body mechanics: patient asked about using shovel..    Assessment/Plan  SHORT TERM GOALS: Time for goal achievement:  3 weeks  1. Pt will be able to demonstrate initial HEP - MET  2. Pt reports LBP at least 50% better.  3. Pt can demo safe body mechanics.  4. Pt can tolerate core strength ex.  5. If patient does not tolerate Land PT will trial Aquatic for pain relief/unloading.    LONG TERM GOALS: Time for goal achievement: 3 months  1. Pt to score < 30% on Modified Oswestry score.  2. Pt reports he is ready for DC to [facility] for self management of his condition.   3. Pt has 70% improved AROM lumbar in standing.   4. Patient can now ride his bike with his child.       After prone ex. Pain down to 2/10, felt better after.  Patient has written HEP.  Needs to bring phone number for next PT visit.     Progress per Plan of Care            Timed:         Manual Therapy:         mins  16556;     Therapeutic Exercise:    35     mins  01859;     Neuromuscular Muna:        mins  25175;    Therapeutic Activity:     10     mins  15886;     Gait Training:           mins  46303;     Ultrasound:          mins  84861;    Ionto                                   mins   99023  Self Care                            mins   21901  Canalith Repos                   mins  4209  Aquatic                               mins 57539    Un-Timed:  Electrical Stimulation:          mins  96105 ( );  Dry Needling          mins self-pay  Traction          mins 72773    Timed Treatment:   45   mins   Total Treatment:     45   mins    Manisha Yi PT  [place] License # [id number]  Physical Therapist

## 2020-08-06 ENCOUNTER — TREATMENT (OUTPATIENT)
Dept: PHYSICAL THERAPY | Facility: CLINIC | Age: 25
End: 2020-08-06

## 2020-08-06 DIAGNOSIS — M51.36 DDD (DEGENERATIVE DISC DISEASE), LUMBAR: Primary | ICD-10-CM

## 2020-08-06 PROCEDURE — 97110 THERAPEUTIC EXERCISES: CPT | Performed by: PHYSICAL THERAPIST

## 2020-08-06 NOTE — PROGRESS NOTES
Physical Therapy Daily Progress Note  VISIT#: 3 POC 12    Subjective     Lee Peck reports: Came in with 2/10 LBP today. Last night in bed still up to 8/10. Getting a new mattress tomorrow.  Already walked 1.5 miles this am. Has been doing the ex.   Came in on [employer] [place] today.  Discussed he is smoking weed to help with his back pain. Advised to talk to MD and switch to CBD instead for less side effects.       Objective       See Exercise, Manual, and Modality Logs for complete treatment.     Patient Education: practiced HEP.  Continue TM with arm swing. Was able to do 10 min.   Felt better when he walked faster.   After 2 set EIS 2/10 pain abolished.       Assessment/Plan    SHORT TERM GOALS: Time for goal achievement:  3 weeks  1. Pt will be able to demonstrate initial HEP - MET  2. Pt reports LBP at least 50% better.  3. Pt can demo safe body mechanics.  4. Pt can tolerate core strength ex.  5. If patient does not tolerate Land PT will trial Aquatic for pain relief/unloading.    LONG TERM GOALS: Time for goal achievement: 3 months  1. Pt to score < 30% on Modified Oswestry score.  2. Pt reports he is ready for DC to [facility] for self management of his condition.   3. Pt has 70% improved AROM lumbar in standing.   4. Patient can now ride his bike with his child.       Progressed to prone PPU and upper core strength. Left with NO LBP.  Patient has written HEP.  Needs to bring phone number for next PT visit.     Progress per Plan of Care            Timed:         Manual Therapy:         mins  26971;     Therapeutic Exercise:    40     mins  87112;     Neuromuscular Muna:        mins  83384;    Therapeutic Activity:          mins  95150;     Gait Training:           mins  29856;     Ultrasound:          mins  63500;    Ionto                                   mins   01793  Self Care                            mins   53385  Canalith Repos                   mins  4209  Aquatic                                mins 79945    Un-Timed:  Electrical Stimulation:         mins  65833 ( );  Dry Needling          mins self-pay  Traction          mins 18179    Timed Treatment:   40   mins   Total Treatment:     40   mins    Manisha Yi PT  [place] License # [id number]  Physical Therapist

## 2020-08-17 ENCOUNTER — TELEPHONE (OUTPATIENT)
Dept: FAMILY MEDICINE CLINIC | Facility: CLINIC | Age: 25
End: 2020-08-17

## 2020-08-17 ENCOUNTER — HOSPITAL ENCOUNTER (EMERGENCY)
Facility: HOSPITAL | Age: 25
Discharge: LEFT BEFORE DISCHARGE | End: 2020-08-17

## 2020-08-17 VITALS
DIASTOLIC BLOOD PRESSURE: 82 MMHG | HEIGHT: 68 IN | SYSTOLIC BLOOD PRESSURE: 124 MMHG | BODY MASS INDEX: 24.59 KG/M2 | HEART RATE: 84 BPM | WEIGHT: 162.26 LBS | OXYGEN SATURATION: 98 % | TEMPERATURE: 98.1 F | RESPIRATION RATE: 17 BRPM

## 2020-08-17 PROCEDURE — 99211 OFF/OP EST MAY X REQ PHY/QHP: CPT

## 2020-08-17 NOTE — ED NOTES
"Pt raising his voice sts \"when is a dr going to come in here.  I have been waiting too long and need to see someone now because I need to pick my daughter up\".  I advised him that the provider will be with him as soon as they are available but that the emergency room is a process like any other physicians office or urgent care.  Pt asked how long it would take and I told him the average ED stay typically takes 2-4 hours depending on why your here and how many emergencies come thru the door while you are here.   Pt responded by saying \" you all are slow and I don't have time for this\".  Pt arrived in room at 1140 and started complaining at 1153.   I advised the pt if he felt he couldn't wait for the provider and needed to leave he was more than welcome to go.       Laura Tomlinson, ANNALEE  08/17/20 1218    "

## 2020-08-17 NOTE — ED NOTES
Pt upset because he hasn't been seen yet. Pt notified that provider must see him before orders can be placed. PT got upset, raising his voice and left AMA without signing papers.     Ana Chandler, RN  08/17/20 4623

## 2020-09-11 ENCOUNTER — DOCUMENTATION (OUTPATIENT)
Dept: PHYSICAL THERAPY | Facility: CLINIC | Age: 25
End: 2020-09-11

## 2020-09-11 NOTE — PROGRESS NOTES
Discharge Summary  Discharge Summary from Physical Therapy Report  Reg. KATH Be 1995    Number of Visits: 3       Goals: Partially Met    Discharge Plan: Continue with current home exercise program as instructed  Patient to return to referring/providing physician    Comments: Patient DC per facility policy due to: 3 No show, 1 eval and 2 last PT sessions.  Patient did not call back to reschedule.    Date of Discharge: 20        Manisha Yi, PT  Physical Therapist

## 2021-07-21 ENCOUNTER — HOSPITAL ENCOUNTER (EMERGENCY)
Facility: HOSPITAL | Age: 26
Discharge: HOME | End: 2021-07-21
Attending: EMERGENCY MEDICINE | Admitting: EMERGENCY MEDICINE

## 2021-07-21 VITALS
TEMPERATURE: 98 F | RESPIRATION RATE: 18 BRPM | DIASTOLIC BLOOD PRESSURE: 73 MMHG | SYSTOLIC BLOOD PRESSURE: 122 MMHG | WEIGHT: 161.82 LBS | BODY MASS INDEX: 24.52 KG/M2 | OXYGEN SATURATION: 98 % | HEIGHT: 68 IN | HEART RATE: 67 BPM

## 2021-07-21 DIAGNOSIS — T15.11XA FOREIGN BODY OF RIGHT CONJUNCTIVAL SAC, INITIAL ENCOUNTER: Primary | ICD-10-CM

## 2021-07-21 PROCEDURE — 99283 EMERGENCY DEPT VISIT LOW MDM: CPT

## 2021-07-21 RX ORDER — PROPARACAINE HYDROCHLORIDE 5 MG/ML
1 SOLUTION/ DROPS OPHTHALMIC ONCE
Status: COMPLETED | OUTPATIENT
Start: 2021-07-21 | End: 2021-07-21

## 2021-07-21 RX ADMIN — FLUORESCEIN SODIUM 1 STRIP: 1 STRIP OPHTHALMIC at 11:59

## 2021-07-21 RX ADMIN — PROPARACAINE HYDROCHLORIDE 1 DROP: 5 SOLUTION/ DROPS OPHTHALMIC at 11:59

## 2021-07-21 NOTE — ED PROVIDER NOTES
Subjective   Patient is a 26-year-old male who states he had something his right arm when he was cleaning some equipment.  He complains of pain in that eye.  Nuys other complaints          Review of Systems  Negative for visual acuity changes or other complaint of pain  Past Medical History:   Diagnosis Date   • GERD (gastroesophageal reflux disease)    • Low back pain    • Seizures (CMS/HCC)        Allergies   Allergen Reactions   • Tramadol Nausea And Vomiting       History reviewed. No pertinent surgical history.    Family History   Problem Relation Age of Onset   • COPD Mother    • VIVIANA disease Father        Social History     Socioeconomic History   • Marital status: Single     Spouse name: Not on file   • Number of children: Not on file   • Years of education: Not on file   • Highest education level: Not on file   Tobacco Use   • Smoking status: Current Every Day Smoker     Packs/day: 1.00     Years: 8.00     Pack years: 8.00     Types: Cigarettes, Electronic Cigarette   • Smokeless tobacco: Never Used   Substance and Sexual Activity   • Alcohol use: Yes     Comment: rarely; caffeine 8-10c qd   • Drug use: Yes     Frequency: 4.0 times per week     Types: Marijuana   • Sexual activity: Defer           Objective   Physical Exam  Visual acuity is 20/20 OD and 20/20 OS.  Pupils are equal and reactive to light.  Right conjunctiva is mildly injected.  Eyegrounds normal.  Fluorescein stain shows a small foreign body under his upper eyelid.  This was removed with a cotton swab without difficulty.  No abrasion is noted.  Procedures           ED Course                                           MDM  Number of Diagnoses or Management Options  Diagnosis management comments: Patient has findings consistent with foreign body to his right eye.  Patient improved after removal of the foreign body.  Will be discharged and return for any problems if not improving over the next 24 to 48 hours.    Risk of Complications, Morbidity,  and/or Mortality  Presenting problems: moderate  Diagnostic procedures: moderate  Management options: moderate    Patient Progress  Patient progress: stable      Final diagnoses:   Foreign body of right conjunctival sac, initial encounter       ED Disposition  ED Disposition     ED Disposition Condition Comment    Discharge Stable           No follow-up provider specified.       Medication List      No changes were made to your prescriptions during this visit.          Saurabh Flowers MD  07/21/21 6984

## 2021-07-22 ENCOUNTER — TELEPHONE (OUTPATIENT)
Dept: FAMILY MEDICINE CLINIC | Facility: CLINIC | Age: 26
End: 2021-07-22

## 2021-07-22 NOTE — TELEPHONE ENCOUNTER
THE PATIENT'S MOTHER STATES THAT THE OFFICE WILL NEED TO CALL THE ENT PRACTICE TO SET-UP AN APPOINTMENT FOR THE PATIENT AS IT WILL BE QUICKER. THE PATIENT IS CURRENTLY HAVING TROUBLE HEARING OUT OF BOTH OF HIS EARS.      THE PATIENT WOULD LIKE A FOLLOW-UP CALL [phone].

## 2021-07-23 NOTE — TELEPHONE ENCOUNTER
I have not seen him in a year.  He needs to make an appt to see me before I can refer him anywhere.

## 2021-10-02 ENCOUNTER — HOSPITAL ENCOUNTER (EMERGENCY)
Facility: HOSPITAL | Age: 26
Discharge: HOME | End: 2021-10-02
Attending: EMERGENCY MEDICINE | Admitting: EMERGENCY MEDICINE

## 2021-10-02 ENCOUNTER — APPOINTMENT (OUTPATIENT)
Dept: CT IMAGING | Facility: HOSPITAL | Age: 26
End: 2021-10-02

## 2021-10-02 VITALS
OXYGEN SATURATION: 99 % | DIASTOLIC BLOOD PRESSURE: 68 MMHG | RESPIRATION RATE: 18 BRPM | HEART RATE: 82 BPM | SYSTOLIC BLOOD PRESSURE: 100 MMHG | HEIGHT: 68 IN | TEMPERATURE: 97.8 F | BODY MASS INDEX: 24.4 KG/M2 | WEIGHT: 161 LBS

## 2021-10-02 DIAGNOSIS — F10.929 ALCOHOLIC INTOXICATION WITH COMPLICATION (HCC): Primary | ICD-10-CM

## 2021-10-02 DIAGNOSIS — R56.9 SEIZURE (HCC): ICD-10-CM

## 2021-10-02 LAB
ALBUMIN SERPL-MCNC: 4.5 G/DL (ref 3.5–5.2)
ALBUMIN/GLOB SERPL: 1.8 G/DL
ALP SERPL-CCNC: 88 U/L (ref 39–117)
ALT SERPL W P-5'-P-CCNC: 19 U/L (ref 1–41)
AMPHET+METHAMPHET UR QL: NEGATIVE
ANION GAP SERPL CALCULATED.3IONS-SCNC: 18 MMOL/L (ref 5–15)
APAP SERPL-MCNC: <5 MCG/ML (ref 0–30)
AST SERPL-CCNC: 20 U/L (ref 1–40)
BARBITURATES UR QL SCN: NEGATIVE
BASOPHILS # BLD AUTO: 0.1 10*3/MM3 (ref 0–0.2)
BASOPHILS NFR BLD AUTO: 0.5 % (ref 0–1.5)
BENZODIAZ UR QL SCN: NEGATIVE
BILIRUB SERPL-MCNC: 0.4 MG/DL (ref 0–1.2)
BUN SERPL-MCNC: 15 MG/DL (ref 6–20)
BUN/CREAT SERPL: 17.2 (ref 7–25)
CALCIUM SPEC-SCNC: 9.1 MG/DL (ref 8.6–10.5)
CANNABINOIDS SERPL QL: POSITIVE
CHLORIDE SERPL-SCNC: 103 MMOL/L (ref 98–107)
CK SERPL-CCNC: 218 U/L (ref 20–200)
CO2 SERPL-SCNC: 18 MMOL/L (ref 22–29)
COCAINE UR QL: NEGATIVE
CREAT SERPL-MCNC: 0.87 MG/DL (ref 0.76–1.27)
DEPRECATED RDW RBC AUTO: 41.1 FL (ref 37–54)
EOSINOPHIL # BLD AUTO: 0.4 10*3/MM3 (ref 0–0.4)
EOSINOPHIL NFR BLD AUTO: 3.3 % (ref 0.3–6.2)
ERYTHROCYTE [DISTWIDTH] IN BLOOD BY AUTOMATED COUNT: 12.9 % (ref 12.3–15.4)
ETHANOL UR QL: 0.12 %
GFR SERPL CREATININE-BSD FRML MDRD: 107 ML/MIN/1.73
GLOBULIN UR ELPH-MCNC: 2.5 GM/DL
GLUCOSE BLDC GLUCOMTR-MCNC: 106 MG/DL (ref 70–105)
GLUCOSE SERPL-MCNC: 111 MG/DL (ref 65–99)
HCT VFR BLD AUTO: 43.9 % (ref 37.5–51)
HGB BLD-MCNC: 15.1 G/DL (ref 13–17.7)
LYMPHOCYTES # BLD AUTO: 3.3 10*3/MM3 (ref 0.7–3.1)
LYMPHOCYTES NFR BLD AUTO: 27.2 % (ref 19.6–45.3)
MCH RBC QN AUTO: 31.5 PG (ref 26.6–33)
MCHC RBC AUTO-ENTMCNC: 34.4 G/DL (ref 31.5–35.7)
MCV RBC AUTO: 91.4 FL (ref 79–97)
METHADONE UR QL SCN: NEGATIVE
MONOCYTES # BLD AUTO: 0.7 10*3/MM3 (ref 0.1–0.9)
MONOCYTES NFR BLD AUTO: 5.7 % (ref 5–12)
NEUTROPHILS NFR BLD AUTO: 63.3 % (ref 42.7–76)
NEUTROPHILS NFR BLD AUTO: 7.6 10*3/MM3 (ref 1.7–7)
NRBC BLD AUTO-RTO: 0.1 /100 WBC (ref 0–0.2)
OPIATES UR QL: NEGATIVE
OXYCODONE UR QL SCN: POSITIVE
PLATELET # BLD AUTO: 306 10*3/MM3 (ref 140–450)
PMV BLD AUTO: 8.3 FL (ref 6–12)
POTASSIUM SERPL-SCNC: 3.4 MMOL/L (ref 3.5–5.2)
PROT SERPL-MCNC: 7 G/DL (ref 6–8.5)
RBC # BLD AUTO: 4.81 10*6/MM3 (ref 4.14–5.8)
SALICYLATES SERPL-MCNC: <0.3 MG/DL
SODIUM SERPL-SCNC: 139 MMOL/L (ref 136–145)
WBC # BLD AUTO: 12 10*3/MM3 (ref 3.4–10.8)

## 2021-10-02 PROCEDURE — 99283 EMERGENCY DEPT VISIT LOW MDM: CPT

## 2021-10-02 PROCEDURE — 82962 GLUCOSE BLOOD TEST: CPT

## 2021-10-02 PROCEDURE — 25010000003 LEVETIRACETAM IN NACL 0.75% 1000 MG/100ML SOLUTION: Performed by: EMERGENCY MEDICINE

## 2021-10-02 PROCEDURE — 80143 DRUG ASSAY ACETAMINOPHEN: CPT | Performed by: EMERGENCY MEDICINE

## 2021-10-02 PROCEDURE — 96374 THER/PROPH/DIAG INJ IV PUSH: CPT

## 2021-10-02 PROCEDURE — 82077 ASSAY SPEC XCP UR&BREATH IA: CPT | Performed by: EMERGENCY MEDICINE

## 2021-10-02 PROCEDURE — 80307 DRUG TEST PRSMV CHEM ANLYZR: CPT | Performed by: EMERGENCY MEDICINE

## 2021-10-02 PROCEDURE — 80053 COMPREHEN METABOLIC PANEL: CPT | Performed by: EMERGENCY MEDICINE

## 2021-10-02 PROCEDURE — 85025 COMPLETE CBC W/AUTO DIFF WBC: CPT | Performed by: EMERGENCY MEDICINE

## 2021-10-02 PROCEDURE — 70450 CT HEAD/BRAIN W/O DYE: CPT

## 2021-10-02 PROCEDURE — 82550 ASSAY OF CK (CPK): CPT | Performed by: EMERGENCY MEDICINE

## 2021-10-02 PROCEDURE — 80179 DRUG ASSAY SALICYLATE: CPT | Performed by: EMERGENCY MEDICINE

## 2021-10-02 RX ORDER — LEVETIRACETAM 500 MG/1
500 TABLET ORAL 2 TIMES DAILY
Qty: 30 TABLET | Refills: 0 | Status: SHIPPED | OUTPATIENT
Start: 2021-10-02

## 2021-10-02 RX ORDER — SODIUM CHLORIDE 0.9 % (FLUSH) 0.9 %
10 SYRINGE (ML) INJECTION AS NEEDED
Status: DISCONTINUED | OUTPATIENT
Start: 2021-10-02 | End: 2021-10-02 | Stop reason: HOSPADM

## 2021-10-02 RX ORDER — LEVETIRACETAM 10 MG/ML
1000 INJECTION INTRAVASCULAR ONCE
Status: COMPLETED | OUTPATIENT
Start: 2021-10-02 | End: 2021-10-02

## 2021-10-02 RX ADMIN — LEVETIRACETAM 1000 MG: 1000 INJECTION, SOLUTION INTRAVENOUS at 17:55

## 2021-10-02 RX ADMIN — SODIUM CHLORIDE 1000 ML: 9 INJECTION, SOLUTION INTRAVENOUS at 16:01

## 2021-10-02 NOTE — ED NOTES
Pt ambulatory with steady gait upon discharge from ED.  No s/s of distress.  Pt verbalized understanding of all medications and discharge instructions.      Alexsandra Nogueira RN  10/02/21 3505

## 2021-10-02 NOTE — ED NOTES
"Pt in ER via EMS with c/o alcohol intoxication.  Per EMS, they were called by family b/c pt became diaphoretic in the back of a family van.  Per EMS, family states pt has not had his Keppra for a week.  Pt reports he only drank 4 beers.  Pt reports hx of seizures and states last one was \"a while ago\" when asked how long \"a while was\" his response was \"years\".  Pt reports he takes medication but states he has not taken it \"in a year\".  Pt also states that he can feel his seizures come on and that is what it felt like today.  MD notified.  Pt denies any CP, SOA, fever or NVD.  Pt actively vomited while being assessed and states he does have a HA.  Pt is A/O x4.  No s/s of distress.  RR even and unlabored.  Pt in mask.  Call light within reach.  Will continue to monitor.       Alexsandra Nogueira RN  10/02/21 1612    "

## 2021-10-02 NOTE — DISCHARGE INSTRUCTIONS
Keppra as prescribed.  Sent to pharmacy  Return for recurrence of symptoms vomiting chest or abdominal pain dizziness severe headache visual changes speech difficulty paralysis or any other new or worse problems or concerns.  No driving heights or ladders until this is further worked up by your primary care doctor

## 2021-10-02 NOTE — ED PROVIDER NOTES
Subjective   Complaint decreased responsiveness    History of present is a 26-year-old male who had EMS called by his family because he was in the back of the van after being at a harvest home coming event and he was found to have decreased responsiveness.  Patient was brought in by EMS sleepy but arousable.  No complaints.  Patient does have a history of seizures has not taken his Keppra for a year.  He has been drinking about 4 beers and a couple shots a night.  The patient initially was very sleepy and appeared to be somewhat postictal and could not give me much history at this time.  No reported trauma.  History of seizures not taking Keppra for over a year he has not had a seizure in quite some time no other drug use.  At this time no other history as patient is very sleepy and difficult to arouse          Review of Systems   Unable to perform ROS: Mental status change       Past Medical History:   Diagnosis Date   • GERD (gastroesophageal reflux disease)    • Low back pain    • Seizures (HCC)        Allergies   Allergen Reactions   • Tramadol Nausea And Vomiting       History reviewed. No pertinent surgical history.    Family History   Problem Relation Age of Onset   • COPD Mother    • VIVIANA disease Father        Social History     Socioeconomic History   • Marital status: Single     Spouse name: Not on file   • Number of children: Not on file   • Years of education: Not on file   • Highest education level: Not on file   Tobacco Use   • Smoking status: Current Every Day Smoker     Packs/day: 1.00     Years: 8.00     Pack years: 8.00     Types: Cigarettes, Electronic Cigarette   • Smokeless tobacco: Never Used   Substance and Sexual Activity   • Alcohol use: Yes     Comment: rarely; caffeine 8-10c qd   • Drug use: Yes     Frequency: 4.0 times per week     Types: Marijuana   • Sexual activity: Defer     Prior to Admission medications    Medication Sig Start Date End Date Taking? Authorizing Provider    levETIRAcetam (KEPPRA) 500 MG tablet Take 1 tablet by mouth 2 (Two) Times a Day. 10/2/21   Rommel Almonte MD   omeprazole (priLOSEC) 20 MG capsule Take 1 capsule by mouth 2 (Two) Times a Day. 6/9/20   Jaquelin Starkey MD   levETIRAcetam (KEPPRA) 500 MG tablet Take 500 mg by mouth 2 (Two) Times a Day.  10/2/21  Provider, MD Iris   Patient is not taking Keppra        Objective   Physical Exam  26-year-old laying on a stretcher only responds to deep painful stimulus but would not speak at this time  HEENT no obvious trauma pupils equal and reactive extraocular muscles intact no mandible tenderness or facial tenderness noted  Back no direct surface lumbar spine tenderness no step-off pulmonary trachea midline without JVD no bruits lungs clear no retractions heart regular without murmur abdomen soft nontender good bowel sounds no other masses extremities pulses are equal throughout upper and lower extremities no edema cords or Homans' sign or evidence of DVT.  Skin warm dry without rashes or cellulitic changes neurologic he is very sleepy response to pain status but moving all 4 there is been no focal deficit this time no obvious trauma  Procedures           ED Course      Results for orders placed or performed during the hospital encounter of 10/02/21   Comprehensive Metabolic Panel    Specimen: Blood   Result Value Ref Range    Glucose 111 (H) 65 - 99 mg/dL    BUN 15 6 - 20 mg/dL    Creatinine 0.87 0.76 - 1.27 mg/dL    Sodium 139 136 - 145 mmol/L    Potassium 3.4 (L) 3.5 - 5.2 mmol/L    Chloride 103 98 - 107 mmol/L    CO2 18.0 (L) 22.0 - 29.0 mmol/L    Calcium 9.1 8.6 - 10.5 mg/dL    Total Protein 7.0 6.0 - 8.5 g/dL    Albumin 4.50 3.50 - 5.20 g/dL    ALT (SGPT) 19 1 - 41 U/L    AST (SGOT) 20 1 - 40 U/L    Alkaline Phosphatase 88 39 - 117 U/L    Total Bilirubin 0.4 0.0 - 1.2 mg/dL    eGFR Non African Amer 107 >60 mL/min/1.73    Globulin 2.5 gm/dL    A/G Ratio 1.8 g/dL    BUN/Creatinine Ratio 17.2 7.0 - 25.0     Anion Gap 18.0 (H) 5.0 - 15.0 mmol/L   Acetaminophen Level    Specimen: Blood   Result Value Ref Range    Acetaminophen <5.0 0.0 - 30.0 mcg/mL   Ethanol    Specimen: Blood   Result Value Ref Range    Ethanol % 0.117 %   Urine Drug Screen - Urine, Clean Catch    Specimen: Urine, Clean Catch   Result Value Ref Range    Amphet/Methamphet, Screen Negative Negative    Barbiturates Screen, Urine Negative Negative    Benzodiazepine Screen, Urine Negative Negative    Cocaine Screen, Urine Negative Negative    Opiate Screen Negative Negative    THC, Screen, Urine Positive (A) Negative    Methadone Screen, Urine Negative Negative    Oxycodone Screen, Urine Positive (A) Negative   Salicylate Level    Specimen: Blood   Result Value Ref Range    Salicylate <0.3 <=30.0 mg/dL   CK    Specimen: Blood   Result Value Ref Range    Creatine Kinase 218 (H) 20 - 200 U/L   CBC Auto Differential    Specimen: Blood   Result Value Ref Range    WBC 12.00 (H) 3.40 - 10.80 10*3/mm3    RBC 4.81 4.14 - 5.80 10*6/mm3    Hemoglobin 15.1 13.0 - 17.7 g/dL    Hematocrit 43.9 37.5 - 51.0 %    MCV 91.4 79.0 - 97.0 fL    MCH 31.5 26.6 - 33.0 pg    MCHC 34.4 31.5 - 35.7 g/dL    RDW 12.9 12.3 - 15.4 %    RDW-SD 41.1 37.0 - 54.0 fl    MPV 8.3 6.0 - 12.0 fL    Platelets 306 140 - 450 10*3/mm3    Neutrophil % 63.3 42.7 - 76.0 %    Lymphocyte % 27.2 19.6 - 45.3 %    Monocyte % 5.7 5.0 - 12.0 %    Eosinophil % 3.3 0.3 - 6.2 %    Basophil % 0.5 0.0 - 1.5 %    Neutrophils, Absolute 7.60 (H) 1.70 - 7.00 10*3/mm3    Lymphocytes, Absolute 3.30 (H) 0.70 - 3.10 10*3/mm3    Monocytes, Absolute 0.70 0.10 - 0.90 10*3/mm3    Eosinophils, Absolute 0.40 0.00 - 0.40 10*3/mm3    Basophils, Absolute 0.10 0.00 - 0.20 10*3/mm3    nRBC 0.1 0.0 - 0.2 /100 WBC   POC Glucose Once    Specimen: Blood   Result Value Ref Range    Glucose 106 (H) 70 - 105 mg/dL     CT Head Without Contrast    Result Date: 10/2/2021  Normal study.  Electronically Signed By-Rogelio Manjarrez MD On:10/2/2021  5:04 PM This report was finalized on [id number] by  Rogelio Manjarrez MD.    Medications   sodium chloride 0.9 % flush 10 mL (has no administration in time range)   sodium chloride 0.9 % bolus 1,000 mL (0 mL Intravenous Stopped 10/2/21 1802)   levETIRAcetam in NaCl 0.75% (KEPPRA) IVPB 1,000 mg (0 mg Intravenous Stopped 10/2/21 1826)                                            MDM  Number of Diagnoses or Management Options  Alcoholic intoxication with complication (HCC): new and requires workup  Seizure (HCC): established and worsening  Diagnosis management comments: Medical decision making patient IV established given 1 L saline and had the above exam evaluation.  CT without showed no acute findings.  Chemistries potassium was 3.4 otherwise unremarkable Tylenol aspirin level negative.  Alcohol was 0.117 screen showed marijuana and oxycodone CBC white count was 12,000 otherwise unremarkable .  The patient now was much more awake and alert he states he felt like he had a seizure he states he has felt like this in the past of seizure he denies any complaints no injury no fever chills sweats cough congestion states has been feeling well otherwise he has a few beers tonight he states but no more than he normally would do he denies currently headache chest or abdominal pain or other symptoms.  No speech difficulty or paralysis he is awake alert orientated x4 is no facial asymmetry normal speech no drift the arms or legs and moves everything without difficulty and has nothing focal on exam at this point Sydnee Coma Scale 15 NIH is 0.  Was given a gram of Keppra IV to cover for possibility of seizure.  We talked about the findings he looks well he will be discharged home for outpatient management follow-up he does have people can keep an eye on them he referred back to neurology and we talked about what to return for I see no evidence of an acute stroke or meningitis or encephalitis neck is supple there is no  photophobia.  He has not been sick.  No evidence of injury at this time he was discharged home for outpatient management.  Labs reviewed by me CT report reviewed by me       Amount and/or Complexity of Data Reviewed  Clinical lab tests: reviewed  Tests in the radiology section of CPT®: reviewed    Risk of Complications, Morbidity, and/or Mortality  Presenting problems: high  Diagnostic procedures: high  Management options: high    Patient Progress  Patient progress: stable      Final diagnoses:   Alcoholic intoxication with complication (HCC)   Seizure (HCC)       ED Disposition  ED Disposition     ED Disposition Condition Comment    Discharge Stable           Jaquelin Starkey MD  [address]  [address]  [place] [address]  [phone]    In 2 days      Amarjit Harman MD  [address]  [address]  [place] [address]  [phone]    In 2 days           Medication List      Changed    levETIRAcetam 500 MG tablet  Commonly known as: KEPPRA  Take 1 tablet by mouth 2 (Two) Times a Day.  What changed: when to take this           Where to Get Your Medications      These medications were sent to [address], [place] - [address] [facility] - [phone]  - [phone] FX  [address] [facility] [place] [address]    Phone: [phone]   · levETIRAcetam 500 MG tablet          Rommel Almonte MD  10/02/21 2024

## 2021-12-28 ENCOUNTER — TELEPHONE (OUTPATIENT)
Dept: FAMILY MEDICINE CLINIC | Facility: CLINIC | Age: 26
End: 2021-12-28

## 2021-12-28 RX ORDER — AZITHROMYCIN 250 MG/1
TABLET, FILM COATED ORAL
Qty: 6 TABLET | Refills: 0 | Status: SHIPPED | OUTPATIENT
Start: 2021-12-28 | End: 2022-01-31

## 2021-12-28 NOTE — TELEPHONE ENCOUNTER
Caller: Matilda Peck    Relationship: Mother    Best call back number: [phone]    What medication are you requesting: SOMETHING FOR COUGH AND CONGESTION     What are your current symptoms: COUGH, CONGESTION, BODY ACHES    How long have you been experiencing symptoms: 2 WEEKS     Have you had these symptoms before:    [x] Yes  [] No    Have you been treated for these symptoms before:   [x] Yes  [] No    If a prescription is needed, what is your preferred pharmacy and phone number:        [facility] [address] [phone] [facility] [phone]     Additional notes:

## 2021-12-29 ENCOUNTER — TELEPHONE (OUTPATIENT)
Dept: FAMILY MEDICINE CLINIC | Facility: CLINIC | Age: 26
End: 2021-12-29

## 2021-12-29 NOTE — TELEPHONE ENCOUNTER
If he is having that much pain then he needs to go to the ER.  I am not sending in any medicine for pain.

## 2021-12-29 NOTE — TELEPHONE ENCOUNTER
PATIENTS MOTHER JOSÉ STATES THEY ALL HAVE COVID BUT PATIENT HAS HAD A SEVERE PAIN IN HIS LOWER-MID BACK THAT CAUSES PAIN IN HIS LEG SOMETIMES. IT HAS BEEN SO BAD THAT THE PATIENT HASN'T BEEN ABLE TO SLEEP HE HAS NOT SLEPT IN 3 DAYS, HE'S VERY TIRED SICK AND NEEDS SOMETHING FOR PAIN SO HE CAN GET SOME REST. HE HAS TAKING TYLENOL, ADVIL, IBUPROFEN, EVEN A TYLENOL 3 A FAMILY MEMBER HAD AND HE STILL IS UNABLE TO SLEEP.     PATIENT MOM CAN BE REACHED AT: [phone]    [facility] [address] - [phone]  - [phone]   [phone]

## 2022-01-31 ENCOUNTER — OFFICE VISIT (OUTPATIENT)
Dept: FAMILY MEDICINE CLINIC | Facility: CLINIC | Age: 27
End: 2022-01-31

## 2022-01-31 ENCOUNTER — PATIENT ROUNDING (BHMG ONLY) (OUTPATIENT)
Dept: FAMILY MEDICINE CLINIC | Facility: CLINIC | Age: 27
End: 2022-01-31

## 2022-01-31 VITALS
HEIGHT: 68 IN | DIASTOLIC BLOOD PRESSURE: 88 MMHG | BODY MASS INDEX: 25.31 KG/M2 | SYSTOLIC BLOOD PRESSURE: 134 MMHG | OXYGEN SATURATION: 97 % | TEMPERATURE: 96.9 F | HEART RATE: 80 BPM | WEIGHT: 167 LBS

## 2022-01-31 DIAGNOSIS — Z72.0 TOBACCO ABUSE: Chronic | ICD-10-CM

## 2022-01-31 DIAGNOSIS — K21.9 GASTROESOPHAGEAL REFLUX DISEASE WITHOUT ESOPHAGITIS: Chronic | ICD-10-CM

## 2022-01-31 DIAGNOSIS — Z13.9 ENCOUNTER FOR HEALTH-RELATED SCREENING: ICD-10-CM

## 2022-01-31 DIAGNOSIS — M51.36 DDD (DEGENERATIVE DISC DISEASE), LUMBAR: Chronic | ICD-10-CM

## 2022-01-31 DIAGNOSIS — G40.909 NONINTRACTABLE EPILEPSY WITHOUT STATUS EPILEPTICUS, UNSPECIFIED EPILEPSY TYPE: Chronic | ICD-10-CM

## 2022-01-31 DIAGNOSIS — G89.29 CHRONIC MIDLINE LOW BACK PAIN WITHOUT SCIATICA: Chronic | ICD-10-CM

## 2022-01-31 DIAGNOSIS — G47.30 SLEEP APNEA IN ADULT: Chronic | ICD-10-CM

## 2022-01-31 DIAGNOSIS — M54.50 CHRONIC MIDLINE LOW BACK PAIN WITHOUT SCIATICA: Chronic | ICD-10-CM

## 2022-01-31 PROCEDURE — 99214 OFFICE O/P EST MOD 30 MIN: CPT | Performed by: NURSE PRACTITIONER

## 2022-01-31 RX ORDER — IBUPROFEN 800 MG/1
TABLET ORAL
COMMUNITY
Start: 2021-11-03 | End: 2022-10-22

## 2022-01-31 NOTE — PROGRESS NOTES
January 31, 2022    Hello, may I speak with Lee Peck?    My name is VALERIO Razo      I am  with [facility]  [address] [place] [address].    Before we get started may I verify your date of birth? 1995    I am calling to officially welcome you to our practice and ask about your recent visit. Is this a good time to talk? yes    Tell me about your visit with us. What things went well?  You all did pretty good, everything went well.       We're always looking for ways to make our patients' experiences even better. Do you have recommendations on ways we may improve?  no    Overall were you satisfied with your first visit to our practice? yes       I appreciate you taking the time to speak with me today. Is there anything else I can do for you? no      Thank you, and have a great day.

## 2022-01-31 NOTE — PATIENT INSTRUCTIONS
Find better sleeping arrangements.  Heat or ice.   Use the pain patches otc.  Physical therapy should contact you.  Continue the ibuprofen .   You should hear about sleep study.

## 2022-01-31 NOTE — PROGRESS NOTES
Subjective   {CC  Problem List  Visit Diagnosis   Encounters  Notes  Medications  Labs  Result Review Imaging  Media :23}     Lee Peck is a 26 y.o. male.     Chief Complaint   Patient presents with   • Back Pain     new pt establishment       History of Present Illness  Patient is new to this office. He is having back pain for 3 years. He had football injury was hit in side when it happened. He was seen in past by sports medicine and he was given shot and said he went to physical therapy. . I reviewed his notes from 7/28/2020. His xray results showed stable narrowing of L5-S1 disc space.   Diagnosis was acute low back pain withut sciatica. Lumbar degenertive disc disease.   Treated  With flexeril and meloxicam PT ortho stretching .   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 He reports back pain is worse at night.   Reports he also stops breathing wakes up and gasps for air. Has been doing this long time.     Seizures: no reported seizures for 5 years. He was diagnosed as child.   Taking keppra 500 mg 2 times a day.     Gerds taking omeprazole 20 mg bid only takes when he has stomach pain.     Low back pain: he is  Taking ibuprofen 800 mg tid. As needed. meloxicam was not helpful he reports. Ketoralac he got this from other doctor.           The following portions of the patient's history were reviewed and updated as appropriate: allergies, current medications, past family history, past medical history, past social history, past surgical history and problem list.      Current Outpatient Medications:   •  ibuprofen (ADVIL,MOTRIN) 800 MG tablet, , Disp: , Rfl:   •  levETIRAcetam (KEPPRA) 500 MG tablet, Take 1 tablet by mouth 2 (Two) Times a Day., Disp: 30 tablet, Rfl: 0  •  omeprazole (priLOSEC) 20 MG capsule, Take 1 capsule by mouth 2 (Two) Times a Day., Disp: 60 capsule, Rfl: 1    Recent Results (from the past 4032 hour(s))   POC Glucose Once    Collection Time: 10/02/21  3:48 PM    Specimen:  Blood   Result Value Ref Range    Glucose 106 (H) 70 - 105 mg/dL   Comprehensive Metabolic Panel    Collection Time: 10/02/21  3:59 PM    Specimen: Blood   Result Value Ref Range    Glucose 111 (H) 65 - 99 mg/dL    BUN 15 6 - 20 mg/dL    Creatinine 0.87 0.76 - 1.27 mg/dL    Sodium 139 136 - 145 mmol/L    Potassium 3.4 (L) 3.5 - 5.2 mmol/L    Chloride 103 98 - 107 mmol/L    CO2 18.0 (L) 22.0 - 29.0 mmol/L    Calcium 9.1 8.6 - 10.5 mg/dL    Total Protein 7.0 6.0 - 8.5 g/dL    Albumin 4.50 3.50 - 5.20 g/dL    ALT (SGPT) 19 1 - 41 U/L    AST (SGOT) 20 1 - 40 U/L    Alkaline Phosphatase 88 39 - 117 U/L    Total Bilirubin 0.4 0.0 - 1.2 mg/dL    eGFR Non African Amer 107 >60 mL/min/1.73    Globulin 2.5 gm/dL    A/G Ratio 1.8 g/dL    BUN/Creatinine Ratio 17.2 7.0 - 25.0    Anion Gap 18.0 (H) 5.0 - 15.0 mmol/L   Acetaminophen Level    Collection Time: 10/02/21  3:59 PM    Specimen: Blood   Result Value Ref Range    Acetaminophen <5.0 0.0 - 30.0 mcg/mL   Ethanol    Collection Time: 10/02/21  3:59 PM    Specimen: Blood   Result Value Ref Range    Ethanol % 0.117 %   Salicylate Level    Collection Time: 10/02/21  3:59 PM    Specimen: Blood   Result Value Ref Range    Salicylate <0.3 <=30.0 mg/dL   CK    Collection Time: 10/02/21  3:59 PM    Specimen: Blood   Result Value Ref Range    Creatine Kinase 218 (H) 20 - 200 U/L   CBC Auto Differential    Collection Time: 10/02/21  3:59 PM    Specimen: Blood   Result Value Ref Range    WBC 12.00 (H) 3.40 - 10.80 10*3/mm3    RBC 4.81 4.14 - 5.80 10*6/mm3    Hemoglobin 15.1 13.0 - 17.7 g/dL    Hematocrit 43.9 37.5 - 51.0 %    MCV 91.4 79.0 - 97.0 fL    MCH 31.5 26.6 - 33.0 pg    MCHC 34.4 31.5 - 35.7 g/dL    RDW 12.9 12.3 - 15.4 %    RDW-SD 41.1 37.0 - 54.0 fl    MPV 8.3 6.0 - 12.0 fL    Platelets 306 140 - 450 10*3/mm3    Neutrophil % 63.3 42.7 - 76.0 %    Lymphocyte % 27.2 19.6 - 45.3 %    Monocyte % 5.7 5.0 - 12.0 %    Eosinophil % 3.3 0.3 - 6.2 %    Basophil % 0.5 0.0 - 1.5 %     "Neutrophils, Absolute 7.60 (H) 1.70 - 7.00 10*3/mm3    Lymphocytes, Absolute 3.30 (H) 0.70 - 3.10 10*3/mm3    Monocytes, Absolute 0.70 0.10 - 0.90 10*3/mm3    Eosinophils, Absolute 0.40 0.00 - 0.40 10*3/mm3    Basophils, Absolute 0.10 0.00 - 0.20 10*3/mm3    nRBC 0.1 0.0 - 0.2 /100 WBC   Urine Drug Screen - Urine, Clean Catch    Collection Time: 10/02/21  4:02 PM    Specimen: Urine, Clean Catch   Result Value Ref Range    Amphet/Methamphet, Screen Negative Negative    Barbiturates Screen, Urine Negative Negative    Benzodiazepine Screen, Urine Negative Negative    Cocaine Screen, Urine Negative Negative    Opiate Screen Negative Negative    THC, Screen, Urine Positive (A) Negative    Methadone Screen, Urine Negative Negative    Oxycodone Screen, Urine Positive (A) Negative         Review of Systems   Respiratory: Positive for cough. Negative for wheezing.    Cardiovascular: Negative for chest pain, palpitations and leg swelling.   Genitourinary:        Has had urgency since baby   Musculoskeletal: Positive for back pain.   Neurological: Positive for seizures.        No seizures for 5 years.    Hematological: Negative.    Psychiatric/Behavioral: Negative for hallucinations, suicidal ideas and depressed mood.       Objective     /88 (BP Location: Left arm, Patient Position: Sitting, Cuff Size: Adult)   Pulse 80   Temp 96.9 °F (36.1 °C) (Infrared)   Ht 172.7 cm (68\")   Wt 75.8 kg (167 lb)   SpO2 97%   BMI 25.39 kg/m²     Physical Exam  Vitals and nursing note reviewed.   Constitutional:       Appearance: Normal appearance.   HENT:      Head: Normocephalic.      Right Ear: External ear normal.      Left Ear: External ear normal.      Nose: Nose normal.      Mouth/Throat:      Mouth: Mucous membranes are moist.   Eyes:      Pupils: Pupils are equal, round, and reactive to light.   Cardiovascular:      Rate and Rhythm: Normal rate and regular rhythm.      Pulses: Normal pulses.      Heart sounds: Normal heart " sounds.   Pulmonary:      Effort: Pulmonary effort is normal.      Breath sounds: Normal breath sounds.   Abdominal:      General: Bowel sounds are normal.      Palpations: Abdomen is soft.   Musculoskeletal:        Arms:       Cervical back: Normal range of motion.      Comments: Tender with palpation. Limited ROM due to tense feeling and pain non radiating. Straight leg test positive right low back pain.   Limited toe touching or side to side   Pain more right than left.  No radicular pain.      Skin:     General: Skin is warm and dry.   Neurological:      Mental Status: He is alert.      GCS: GCS eye subscore is 4. GCS verbal subscore is 5. GCS motor subscore is 6.      Motor: Motor function is intact.      Coordination: Coordination is intact.      Gait: Gait is intact.      Deep Tendon Reflexes:      Reflex Scores:       Tricep reflexes are 3+ on the right side and 3+ on the left side.       Bicep reflexes are 3+ on the right side and 3+ on the left side.       Brachioradialis reflexes are 3+ on the right side and 3+ on the left side.       Patellar reflexes are 3+ on the right side and 3+ on the left side.       Achilles reflexes are 3+ on the right side and 3+ on the left side.        Result Review :                Assessment/Plan    Diagnoses and all orders for this visit:    1. Tobacco abuse  Comments:  recommend he stop smoking    2. Gastroesophageal reflux disease without esophagitis  Comments:  stable    3. Nonintractable epilepsy without status epilepticus, unspecified epilepsy type (HCC)  Comments:  stable  Orders:  -     Ambulatory Referral to Sleep Medicine  -     CBC & Differential; Future  -     Basic Metabolic Panel; Future    4. DDD (degenerative disc disease), lumbar  Comments:  referral to PT and MRI ordered with xrays.   Orders:  -     MRI Lumbar Spine With & Without Contrast; Future  -     Ambulatory Referral to Physical Therapy Evaluate and treat  -     XR Spine Lumbar Complete With Flex &  Ext; Future  -     CBC & Differential; Future  -     Basic Metabolic Panel; Future    5. Chronic midline low back pain without sciatica  Comments:  discussed options will order mRI and xrays and PT for evalautaion  Orders:  -     MRI Lumbar Spine With & Without Contrast; Future  -     Ambulatory Referral to Physical Therapy Evaluate and treat  -     XR Spine Lumbar Complete With Flex & Ext; Future    6. Sleep apnea in adult  Comments:  sleep study ordered   Orders:  -     Ambulatory Referral to Sleep Medicine    7. Encounter for health-related screening  Comments:  routine labs ordered.   Orders:  -     Hepatitis C antibody; Future      There are no Patient Instructions on file for this visit.    Follow Up   No follow-ups on file.    Patient was given instructions and counseling regarding his condition or for health maintenance advice. Please see specific information pulled into the AVS if appropriate.     Sara Betancur, APRN    01/31/22

## 2022-02-09 ENCOUNTER — APPOINTMENT (OUTPATIENT)
Dept: MRI IMAGING | Facility: HOSPITAL | Age: 27
End: 2022-02-09

## 2022-02-10 ENCOUNTER — TELEPHONE (OUTPATIENT)
Dept: FAMILY MEDICINE CLINIC | Facility: CLINIC | Age: 27
End: 2022-02-10

## 2022-02-10 NOTE — TELEPHONE ENCOUNTER
He has a physical therapy order in there so he will have to go to that for evaluation and then just tell him that they can help determine if will get an ED MRI before the end of the 6 weeks

## 2022-02-10 NOTE — TELEPHONE ENCOUNTER
MRI denied by insurance. Pt cancelled appt per authorizations dept. Pt needs at least 6 weeks of physical therapy first before insurance will approve MRI.

## 2022-02-23 ENCOUNTER — APPOINTMENT (OUTPATIENT)
Dept: MRI IMAGING | Facility: HOSPITAL | Age: 27
End: 2022-02-23

## 2022-05-10 ENCOUNTER — HOSPITAL ENCOUNTER (EMERGENCY)
Facility: HOSPITAL | Age: 27
Discharge: LEFT BEFORE DISCHARGE | End: 2022-05-10

## 2022-05-10 VITALS
HEIGHT: 68 IN | SYSTOLIC BLOOD PRESSURE: 129 MMHG | BODY MASS INDEX: 24.62 KG/M2 | DIASTOLIC BLOOD PRESSURE: 80 MMHG | WEIGHT: 162.48 LBS | OXYGEN SATURATION: 99 % | RESPIRATION RATE: 18 BRPM | TEMPERATURE: 98.9 F | HEART RATE: 89 BPM

## 2022-05-10 PROCEDURE — 99211 OFF/OP EST MAY X REQ PHY/QHP: CPT

## 2022-06-13 ENCOUNTER — HOSPITAL ENCOUNTER (EMERGENCY)
Facility: HOSPITAL | Age: 27
Discharge: HOME | End: 2022-06-13
Attending: EMERGENCY MEDICINE | Admitting: EMERGENCY MEDICINE

## 2022-06-13 ENCOUNTER — APPOINTMENT (OUTPATIENT)
Dept: GENERAL RADIOLOGY | Facility: HOSPITAL | Age: 27
End: 2022-06-13

## 2022-06-13 VITALS
DIASTOLIC BLOOD PRESSURE: 62 MMHG | WEIGHT: 164 LBS | RESPIRATION RATE: 19 BRPM | OXYGEN SATURATION: 98 % | HEIGHT: 68 IN | BODY MASS INDEX: 24.86 KG/M2 | SYSTOLIC BLOOD PRESSURE: 101 MMHG | HEART RATE: 86 BPM | TEMPERATURE: 98.7 F

## 2022-06-13 DIAGNOSIS — M79.602 LEFT ARM PAIN: ICD-10-CM

## 2022-06-13 DIAGNOSIS — R42 DIZZINESS: Primary | ICD-10-CM

## 2022-06-13 LAB
ANION GAP SERPL CALCULATED.3IONS-SCNC: 12 MMOL/L (ref 5–15)
BASOPHILS # BLD AUTO: 0.1 10*3/MM3 (ref 0–0.2)
BASOPHILS NFR BLD AUTO: 0.6 % (ref 0–1.5)
BUN SERPL-MCNC: 12 MG/DL (ref 6–20)
BUN/CREAT SERPL: 14.1 (ref 7–25)
CALCIUM SPEC-SCNC: 9.6 MG/DL (ref 8.6–10.5)
CHLORIDE SERPL-SCNC: 103 MMOL/L (ref 98–107)
CO2 SERPL-SCNC: 21 MMOL/L (ref 22–29)
CREAT SERPL-MCNC: 0.85 MG/DL (ref 0.76–1.27)
DEPRECATED RDW RBC AUTO: 42.9 FL (ref 37–54)
EGFRCR SERPLBLD CKD-EPI 2021: 122.9 ML/MIN/1.73
EOSINOPHIL # BLD AUTO: 0.3 10*3/MM3 (ref 0–0.4)
EOSINOPHIL NFR BLD AUTO: 2.2 % (ref 0.3–6.2)
ERYTHROCYTE [DISTWIDTH] IN BLOOD BY AUTOMATED COUNT: 13.2 % (ref 12.3–15.4)
GLUCOSE SERPL-MCNC: 93 MG/DL (ref 65–99)
HCT VFR BLD AUTO: 47.2 % (ref 37.5–51)
HGB BLD-MCNC: 15.6 G/DL (ref 13–17.7)
LYMPHOCYTES # BLD AUTO: 1.4 10*3/MM3 (ref 0.7–3.1)
LYMPHOCYTES NFR BLD AUTO: 11.1 % (ref 19.6–45.3)
MCH RBC QN AUTO: 30.6 PG (ref 26.6–33)
MCHC RBC AUTO-ENTMCNC: 33 G/DL (ref 31.5–35.7)
MCV RBC AUTO: 92.8 FL (ref 79–97)
MONOCYTES # BLD AUTO: 0.9 10*3/MM3 (ref 0.1–0.9)
MONOCYTES NFR BLD AUTO: 7.5 % (ref 5–12)
NEUTROPHILS NFR BLD AUTO: 78.6 % (ref 42.7–76)
NEUTROPHILS NFR BLD AUTO: 9.9 10*3/MM3 (ref 1.7–7)
NRBC BLD AUTO-RTO: 0.1 /100 WBC (ref 0–0.2)
PLATELET # BLD AUTO: 266 10*3/MM3 (ref 140–450)
PMV BLD AUTO: 8.6 FL (ref 6–12)
POTASSIUM SERPL-SCNC: 3.7 MMOL/L (ref 3.5–5.2)
QT INTERVAL: 334 MS
RBC # BLD AUTO: 5.09 10*6/MM3 (ref 4.14–5.8)
SODIUM SERPL-SCNC: 136 MMOL/L (ref 136–145)
WBC NRBC COR # BLD: 12.6 10*3/MM3 (ref 3.4–10.8)

## 2022-06-13 PROCEDURE — 99283 EMERGENCY DEPT VISIT LOW MDM: CPT

## 2022-06-13 PROCEDURE — 93005 ELECTROCARDIOGRAM TRACING: CPT | Performed by: EMERGENCY MEDICINE

## 2022-06-13 PROCEDURE — 80048 BASIC METABOLIC PNL TOTAL CA: CPT | Performed by: EMERGENCY MEDICINE

## 2022-06-13 PROCEDURE — 73060 X-RAY EXAM OF HUMERUS: CPT

## 2022-06-13 PROCEDURE — 85025 COMPLETE CBC W/AUTO DIFF WBC: CPT | Performed by: EMERGENCY MEDICINE

## 2022-06-13 PROCEDURE — 93005 ELECTROCARDIOGRAM TRACING: CPT

## 2022-06-13 RX ORDER — NAPROXEN 375 MG/1
375 TABLET ORAL 2 TIMES DAILY PRN
Qty: 14 TABLET | Refills: 0 | OUTPATIENT
Start: 2022-06-13 | End: 2022-10-22

## 2022-06-13 RX ORDER — SODIUM CHLORIDE 0.9 % (FLUSH) 0.9 %
10 SYRINGE (ML) INJECTION AS NEEDED
Status: DISCONTINUED | OUTPATIENT
Start: 2022-06-13 | End: 2022-06-13 | Stop reason: HOSPADM

## 2022-06-13 NOTE — ED PROVIDER NOTES
"Subjective   Patient is a 27-year-old male complaint generalized weakness and dizziness for the past 24 hours.  Also complains of left upper arm pain.  Denies recent trauma numbness tingling or other complaint.          Review of Systems   Constitutional: Negative for chills and fever.   HENT: Negative for congestion and sore throat.    Eyes: Negative for visual disturbance.   Respiratory: Negative for cough and shortness of breath.    Cardiovascular: Negative for chest pain.   Gastrointestinal: Negative for abdominal pain, diarrhea and vomiting.   Endocrine: Negative for polyuria.   Genitourinary: Negative for dysuria and flank pain.   Musculoskeletal: Negative for back pain and myalgias.   Neurological: Positive for dizziness and weakness.     A complete review of system was obtained and is otherwise negative  Past Medical History:   Diagnosis Date   • GERD (gastroesophageal reflux disease)    • Low back pain    • Seizures (HCC)        Allergies   Allergen Reactions   • Tramadol Nausea And Vomiting     \"shakey\"       No past surgical history on file.    Family History   Problem Relation Age of Onset   • COPD Mother    • VIVIANA disease Father        Social History     Socioeconomic History   • Marital status:    Tobacco Use   • Smoking status: Current Every Day Smoker     Packs/day: 0.25     Years: 8.00     Pack years: 2.00     Types: Cigarettes, Electronic Cigarette   • Smokeless tobacco: Never Used   • Tobacco comment: 4 cigarettes/day   Vaping Use   • Vaping Use: Some days   • Substances: Nicotine, Flavoring   • Devices: Disposable   • Passive vaping exposure: Yes   Substance and Sexual Activity   • Alcohol use: Not Currently     Comment: rarely; caffeine 8-10c qd   • Drug use: Not Currently     Frequency: 4.0 times per week     Types: Marijuana   • Sexual activity: Defer           Objective   Physical Exam  Neurologic exam is nonfocal.  HEENT exam shows TMs to be clear.  Oropharynx comers but sclera is " nonicteric.  Neck has no adenopathy JVD or bruits.  Lungs are clear.  Heart has a regular rate and rhythm without murmur rub or gallop.  Chest is nontender.  Abdomen is soft nontender.  Patient has normal bowel sounds without rebound or guarding.  Back has no CVA tenderness.  Extremity exam has no cyanosis or edema.  Procedures     My EKG interpretation shows normal sinus rhythm with no acute ST change      ED Course            Results for orders placed or performed during the hospital encounter of 06/13/22   Basic Metabolic Panel    Specimen: Blood   Result Value Ref Range    Glucose 93 65 - 99 mg/dL    BUN 12 6 - 20 mg/dL    Creatinine 0.85 0.76 - 1.27 mg/dL    Sodium 136 136 - 145 mmol/L    Potassium 3.7 3.5 - 5.2 mmol/L    Chloride 103 98 - 107 mmol/L    CO2 21.0 (L) 22.0 - 29.0 mmol/L    Calcium 9.6 8.6 - 10.5 mg/dL    BUN/Creatinine Ratio 14.1 7.0 - 25.0    Anion Gap 12.0 5.0 - 15.0 mmol/L    eGFR 122.9 >60.0 mL/min/1.73   CBC Auto Differential    Specimen: Blood   Result Value Ref Range    WBC 12.60 (H) 3.40 - 10.80 10*3/mm3    RBC 5.09 4.14 - 5.80 10*6/mm3    Hemoglobin 15.6 13.0 - 17.7 g/dL    Hematocrit 47.2 37.5 - 51.0 %    MCV 92.8 79.0 - 97.0 fL    MCH 30.6 26.6 - 33.0 pg    MCHC 33.0 31.5 - 35.7 g/dL    RDW 13.2 12.3 - 15.4 %    RDW-SD 42.9 37.0 - 54.0 fl    MPV 8.6 6.0 - 12.0 fL    Platelets 266 140 - 450 10*3/mm3    Neutrophil % 78.6 (H) 42.7 - 76.0 %    Lymphocyte % 11.1 (L) 19.6 - 45.3 %    Monocyte % 7.5 5.0 - 12.0 %    Eosinophil % 2.2 0.3 - 6.2 %    Basophil % 0.6 0.0 - 1.5 %    Neutrophils, Absolute 9.90 (H) 1.70 - 7.00 10*3/mm3    Lymphocytes, Absolute 1.40 0.70 - 3.10 10*3/mm3    Monocytes, Absolute 0.90 0.10 - 0.90 10*3/mm3    Eosinophils, Absolute 0.30 0.00 - 0.40 10*3/mm3    Basophils, Absolute 0.10 0.00 - 0.20 10*3/mm3    nRBC 0.1 0.0 - 0.2 /100 WBC   ECG 12 Lead   Result Value Ref Range    QT Interval 334 ms     XR Humerus Left    Result Date: 6/13/2022  Normal 2 views of the left  humerus.  Electronically Signed By-Cecilia Marion MD On:6/13/2022 1:11 PM This report was finalized on [id number] by  Cecilia Marion MD.                                            MDM  Number of Diagnoses or Management Options  Diagnosis management comments: My x-ray interpretation the patient's left rate is normal.  Patient has no evidence of metabolic abnormality or electrolyte abnormality.  There is no renal insufficiency.  There is no evidence acute infectious process.  Patient will be discharged with a diagnosis of dizziness and left arm pain.  He will be placed on Naprosyn.  We will see his MD for recheck.       Amount and/or Complexity of Data Reviewed  Clinical lab tests: reviewed  Tests in the radiology section of CPT®: reviewed  Tests in the medicine section of CPT®: reviewed    Risk of Complications, Morbidity, and/or Mortality  Presenting problems: high  Diagnostic procedures: high  Management options: high    Patient Progress  Patient progress: stable      Final diagnoses:   Dizziness   Left arm pain       ED Disposition  ED Disposition     ED Disposition   Discharge    Condition   Stable    Comment   --             No follow-up provider specified.       Medication List      New Prescriptions    naproxen 375 MG tablet  Commonly known as: NAPROSYN  Take 1 tablet by mouth 2 (Two) Times a Day As Needed for Moderate Pain .           Where to Get Your Medications      Information about where to get these medications is not yet available    Ask your nurse or doctor about these medications  · naproxen 375 MG tablet          Saurabh Flowers MD  06/13/22 [id number]

## 2022-06-20 ENCOUNTER — OFFICE VISIT (OUTPATIENT)
Dept: FAMILY MEDICINE CLINIC | Facility: CLINIC | Age: 27
End: 2022-06-20

## 2022-06-20 VITALS
DIASTOLIC BLOOD PRESSURE: 74 MMHG | HEART RATE: 91 BPM | WEIGHT: 156 LBS | OXYGEN SATURATION: 98 % | TEMPERATURE: 96.9 F | HEIGHT: 68 IN | BODY MASS INDEX: 23.64 KG/M2 | SYSTOLIC BLOOD PRESSURE: 116 MMHG

## 2022-06-20 DIAGNOSIS — J02.9 PHARYNGITIS, UNSPECIFIED ETIOLOGY: ICD-10-CM

## 2022-06-20 DIAGNOSIS — R50.9 FEVER, UNSPECIFIED FEVER CAUSE: Primary | ICD-10-CM

## 2022-06-20 PROCEDURE — 99214 OFFICE O/P EST MOD 30 MIN: CPT | Performed by: NURSE PRACTITIONER

## 2022-06-20 NOTE — PROGRESS NOTES
Subjective   {CC  Problem List  Visit Diagnosis   Encounters  Notes  Medications  Labs  Result Review Imaging  Media :23}     Lee Peck is a 27 y.o. male.     Chief Complaint   Patient presents with   • Abnormal Lab     Protein in urine at [facility] [place]       History of Present Illness  Patient was at [facility] [place] 8 days ago for having sore throat .   He was told by looking at his pee he was sick.   He said he did not have strep or covid. He said they did not know what was going on with him so sent him here.   He said he continues to have pain left side front of his neck under his ear.   No fevers. Just throat fever was 102.   He was treated with antibiotics. He is still taking them.   He said he feels it has helped .   The following portions of the patient's history were reviewed and updated as appropriate: allergies, current medications, past family history, past medical history, past social history, past surgical history and problem list.      Current Outpatient Medications:   •  ibuprofen (ADVIL,MOTRIN) 800 MG tablet, , Disp: , Rfl:   •  levETIRAcetam (KEPPRA) 500 MG tablet, Take 1 tablet by mouth 2 (Two) Times a Day., Disp: 30 tablet, Rfl: 0  •  naproxen (NAPROSYN) 375 MG tablet, Take 1 tablet by mouth 2 (Two) Times a Day As Needed for Moderate Pain ., Disp: 14 tablet, Rfl: 0  •  omeprazole (priLOSEC) 20 MG capsule, Take 1 capsule by mouth 2 (Two) Times a Day., Disp: 60 capsule, Rfl: 1    Recent Results (from the past 4032 hour(s))   ECG 12 Lead    Collection Time: 06/13/22 10:55 AM   Result Value Ref Range    QT Interval 334 ms   Basic Metabolic Panel    Collection Time: 06/13/22 12:45 PM    Specimen: Blood   Result Value Ref Range    Glucose 93 65 - 99 mg/dL    BUN 12 6 - 20 mg/dL    Creatinine 0.85 0.76 - 1.27 mg/dL    Sodium 136 136 - 145 mmol/L    Potassium 3.7 3.5 - 5.2 mmol/L    Chloride 103 98 - 107 mmol/L    CO2 21.0 (L) 22.0 - 29.0 mmol/L    Calcium 9.6 8.6 - 10.5 mg/dL    BUN/Creatinine Ratio 14.1 7.0 -  "25.0    Anion Gap 12.0 5.0 - 15.0 mmol/L    eGFR 122.9 >60.0 mL/min/1.73   CBC Auto Differential    Collection Time: 06/13/22 12:45 PM    Specimen: Blood   Result Value Ref Range    WBC 12.60 (H) 3.40 - 10.80 10*3/mm3    RBC 5.09 4.14 - 5.80 10*6/mm3    Hemoglobin 15.6 13.0 - 17.7 g/dL    Hematocrit 47.2 37.5 - 51.0 %    MCV 92.8 79.0 - 97.0 fL    MCH 30.6 26.6 - 33.0 pg    MCHC 33.0 31.5 - 35.7 g/dL    RDW 13.2 12.3 - 15.4 %    RDW-SD 42.9 37.0 - 54.0 fl    MPV 8.6 6.0 - 12.0 fL    Platelets 266 140 - 450 10*3/mm3    Neutrophil % 78.6 (H) 42.7 - 76.0 %    Lymphocyte % 11.1 (L) 19.6 - 45.3 %    Monocyte % 7.5 5.0 - 12.0 %    Eosinophil % 2.2 0.3 - 6.2 %    Basophil % 0.6 0.0 - 1.5 %    Neutrophils, Absolute 9.90 (H) 1.70 - 7.00 10*3/mm3    Lymphocytes, Absolute 1.40 0.70 - 3.10 10*3/mm3    Monocytes, Absolute 0.90 0.10 - 0.90 10*3/mm3    Eosinophils, Absolute 0.30 0.00 - 0.40 10*3/mm3    Basophils, Absolute 0.10 0.00 - 0.20 10*3/mm3    nRBC 0.1 0.0 - 0.2 /100 WBC         Review of Systems    Objective     /74 (BP Location: Left arm, Patient Position: Sitting, Cuff Size: Adult)   Pulse 91   Temp 96.9 °F (36.1 °C) (Infrared)   Ht 172.7 cm (68\")   Wt 70.8 kg (156 lb)   SpO2 98%   BMI 23.72 kg/m²     Physical Exam  Vitals and nursing note reviewed.   Constitutional:       Appearance: Normal appearance.   HENT:      Head: Normocephalic.      Right Ear: External ear normal.      Left Ear: External ear normal.      Nose: Nose normal.      Mouth/Throat:      Mouth: Mucous membranes are moist.   Eyes:      Conjunctiva/sclera: Conjunctivae normal.      Pupils: Pupils are equal, round, and reactive to light.   Cardiovascular:      Rate and Rhythm: Normal rate and regular rhythm.      Pulses: Normal pulses.      Heart sounds: Normal heart sounds.   Pulmonary:      Effort: Pulmonary effort is normal.      Breath sounds: Normal breath sounds.   Abdominal:      General: Bowel sounds are normal.      Palpations: Abdomen " is soft.   Musculoskeletal:         General: Normal range of motion.      Cervical back: Neck supple. Tenderness present. No rigidity.   Skin:     General: Skin is warm and dry.      Capillary Refill: Capillary refill takes less than 2 seconds.   Neurological:      General: No focal deficit present.      Mental Status: He is alert and oriented to person, place, and time.   Psychiatric:         Mood and Affect: Mood normal.         Behavior: Behavior normal.         Thought Content: Thought content normal.         Judgment: Judgment normal.         Result Review :                Assessment & Plan    Diagnoses and all orders for this visit:    1. Fever, unspecified fever cause (Primary)  -     CBC & Differential; Future  -     Comprehensive Metabolic Panel; Future  -     Mononucleosis Test, Qual With Reflex; Future  -     Antistreptolysin O (ASO) Screen; Future  -     Urinalysis With Culture If Indicated -; Future  -     Suleiman-Barr Virus VCA, IgA; Future  -     Suleiman-Barr Virus VCA, IgM; Future  -     Cytomegalovirus Antibody, IgG; Future  -     Cytomegalovirus Antibody, IgM; Future    2. Pharyngitis, unspecified etiology  -     CBC & Differential; Future  -     Comprehensive Metabolic Panel; Future  -     Mononucleosis Test, Qual With Reflex; Future  -     Antistreptolysin O (ASO) Screen; Future  -     Urinalysis With Culture If Indicated -; Future  -     Suleiman-Barr Virus VCA, IgA; Future  -     Suleiman-Barr Virus VCA, IgM; Future  -     Cytomegalovirus Antibody, IgG; Future  -     Cytomegalovirus Antibody, IgM; Future      Patient Instructions   Call in couple days for test results.   Finish antibiotics.   Follow up if fever returns or worsens.       Follow Up   No follow-ups on file.    Patient was given instructions and counseling regarding his condition or for health maintenance advice. Please see specific information pulled into the AVS if appropriate.     Sara Betancur, APRN    06/20/22

## 2022-06-20 NOTE — PATIENT INSTRUCTIONS
Call in couple days for test results.   Finish antibiotics.   Follow up if fever returns or worsens.

## 2022-06-23 ENCOUNTER — LAB (OUTPATIENT)
Dept: LAB | Facility: HOSPITAL | Age: 27
End: 2022-06-23

## 2022-06-23 DIAGNOSIS — J02.9 PHARYNGITIS, UNSPECIFIED ETIOLOGY: ICD-10-CM

## 2022-06-23 DIAGNOSIS — R50.9 FEVER, UNSPECIFIED FEVER CAUSE: ICD-10-CM

## 2022-06-23 LAB
ALBUMIN SERPL-MCNC: 4.4 G/DL (ref 3.5–5.2)
ALBUMIN/GLOB SERPL: 1.4 G/DL
ALP SERPL-CCNC: 75 U/L (ref 39–117)
ALT SERPL W P-5'-P-CCNC: 21 U/L (ref 1–41)
ANION GAP SERPL CALCULATED.3IONS-SCNC: 11.8 MMOL/L (ref 5–15)
AST SERPL-CCNC: 14 U/L (ref 1–40)
BASOPHILS # BLD AUTO: 0.06 10*3/MM3 (ref 0–0.2)
BASOPHILS NFR BLD AUTO: 0.8 % (ref 0–1.5)
BILIRUB SERPL-MCNC: 0.2 MG/DL (ref 0–1.2)
BILIRUB UR QL STRIP: NEGATIVE
BUN SERPL-MCNC: 14 MG/DL (ref 6–20)
BUN/CREAT SERPL: 13.7 (ref 7–25)
CALCIUM SPEC-SCNC: 9.8 MG/DL (ref 8.6–10.5)
CHLORIDE SERPL-SCNC: 104 MMOL/L (ref 98–107)
CLARITY UR: CLEAR
CO2 SERPL-SCNC: 23.2 MMOL/L (ref 22–29)
COLOR UR: YELLOW
CREAT SERPL-MCNC: 1.02 MG/DL (ref 0.76–1.27)
DEPRECATED RDW RBC AUTO: 39.2 FL (ref 37–54)
EGFRCR SERPLBLD CKD-EPI 2021: 104 ML/MIN/1.73
EOSINOPHIL # BLD AUTO: 0.43 10*3/MM3 (ref 0–0.4)
EOSINOPHIL NFR BLD AUTO: 5.8 % (ref 0.3–6.2)
ERYTHROCYTE [DISTWIDTH] IN BLOOD BY AUTOMATED COUNT: 11.6 % (ref 12.3–15.4)
GLOBULIN UR ELPH-MCNC: 3.2 GM/DL
GLUCOSE SERPL-MCNC: 88 MG/DL (ref 65–99)
GLUCOSE UR STRIP-MCNC: NEGATIVE MG/DL
HCT VFR BLD AUTO: 44.8 % (ref 37.5–51)
HGB BLD-MCNC: 14.8 G/DL (ref 13–17.7)
HGB UR QL STRIP.AUTO: NEGATIVE
IMM GRANULOCYTES # BLD AUTO: 0.01 10*3/MM3 (ref 0–0.05)
IMM GRANULOCYTES NFR BLD AUTO: 0.1 % (ref 0–0.5)
KETONES UR QL STRIP: NEGATIVE
LEUKOCYTE ESTERASE UR QL STRIP.AUTO: NEGATIVE
LYMPHOCYTES # BLD AUTO: 2.29 10*3/MM3 (ref 0.7–3.1)
LYMPHOCYTES NFR BLD AUTO: 30.7 % (ref 19.6–45.3)
MCH RBC QN AUTO: 30.8 PG (ref 26.6–33)
MCHC RBC AUTO-ENTMCNC: 33 G/DL (ref 31.5–35.7)
MCV RBC AUTO: 93.3 FL (ref 79–97)
MONOCYTES # BLD AUTO: 0.62 10*3/MM3 (ref 0.1–0.9)
MONOCYTES NFR BLD AUTO: 8.3 % (ref 5–12)
NEUTROPHILS NFR BLD AUTO: 4.06 10*3/MM3 (ref 1.7–7)
NEUTROPHILS NFR BLD AUTO: 54.3 % (ref 42.7–76)
NITRITE UR QL STRIP: NEGATIVE
NRBC BLD AUTO-RTO: 0 /100 WBC (ref 0–0.2)
PH UR STRIP.AUTO: 6.5 [PH] (ref 5–8)
PLATELET # BLD AUTO: 458 10*3/MM3 (ref 140–450)
PMV BLD AUTO: 10.2 FL (ref 6–12)
POTASSIUM SERPL-SCNC: 4 MMOL/L (ref 3.5–5.2)
PROT SERPL-MCNC: 7.6 G/DL (ref 6–8.5)
PROT UR QL STRIP: NEGATIVE
RBC # BLD AUTO: 4.8 10*6/MM3 (ref 4.14–5.8)
SODIUM SERPL-SCNC: 139 MMOL/L (ref 136–145)
SP GR UR STRIP: 1.02 (ref 1–1.03)
UROBILINOGEN UR QL STRIP: NORMAL
WBC NRBC COR # BLD: 7.47 10*3/MM3 (ref 3.4–10.8)

## 2022-06-23 PROCEDURE — 81003 URINALYSIS AUTO W/O SCOPE: CPT

## 2022-06-23 PROCEDURE — 36415 COLL VENOUS BLD VENIPUNCTURE: CPT

## 2022-06-23 PROCEDURE — 86063 ANTISTREPTOLYSIN O SCREEN: CPT

## 2022-06-23 PROCEDURE — 86645 CMV ANTIBODY IGM: CPT

## 2022-06-23 PROCEDURE — 86644 CMV ANTIBODY: CPT

## 2022-06-23 PROCEDURE — 86665 EPSTEIN-BARR CAPSID VCA: CPT

## 2022-06-23 PROCEDURE — 86308 HETEROPHILE ANTIBODY SCREEN: CPT

## 2022-06-23 PROCEDURE — 85025 COMPLETE CBC W/AUTO DIFF WBC: CPT

## 2022-06-23 PROCEDURE — 80053 COMPREHEN METABOLIC PANEL: CPT

## 2022-06-24 LAB
ASO AB SERPL-ACNC: NEGATIVE [IU]/ML
CMV IGG SERPL IA-ACNC: <0.6 U/ML (ref 0–0.59)
CMV IGM SERPL IA-ACNC: <30 AU/ML (ref 0–29.9)
EBV VCA IGM SER IA-ACNC: <36 U/ML (ref 0–35.9)
HETEROPH AB SER QL LA: NEGATIVE

## 2022-06-29 LAB — EBV AB TO VIRAL CAPSID AG, IGA: 1 U

## 2022-10-22 ENCOUNTER — APPOINTMENT (OUTPATIENT)
Dept: GENERAL RADIOLOGY | Facility: HOSPITAL | Age: 27
End: 2022-10-22

## 2022-10-22 ENCOUNTER — HOSPITAL ENCOUNTER (EMERGENCY)
Facility: HOSPITAL | Age: 27
Discharge: HOME | End: 2022-10-22
Attending: EMERGENCY MEDICINE | Admitting: EMERGENCY MEDICINE

## 2022-10-22 ENCOUNTER — APPOINTMENT (OUTPATIENT)
Dept: CT IMAGING | Facility: HOSPITAL | Age: 27
End: 2022-10-22

## 2022-10-22 VITALS
HEART RATE: 65 BPM | TEMPERATURE: 98.3 F | SYSTOLIC BLOOD PRESSURE: 112 MMHG | DIASTOLIC BLOOD PRESSURE: 77 MMHG | BODY MASS INDEX: 22.81 KG/M2 | OXYGEN SATURATION: 98 % | RESPIRATION RATE: 18 BRPM | HEIGHT: 69 IN | WEIGHT: 154 LBS
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
## 2022-10-22 DIAGNOSIS — M79.642 BILATERAL HAND PAIN: Primary | ICD-10-CM

## 2022-10-22 DIAGNOSIS — R07.81 RIB PAIN: ICD-10-CM

## 2022-10-22 DIAGNOSIS — S20.212A CONTUSION OF RIB ON LEFT SIDE, INITIAL ENCOUNTER: ICD-10-CM

## 2022-10-22 DIAGNOSIS — S60.419A ABRASION OF FINGER OF RIGHT HAND, INITIAL ENCOUNTER: ICD-10-CM

## 2022-10-22 DIAGNOSIS — M79.602 LEFT ARM PAIN: ICD-10-CM

## 2022-10-22 DIAGNOSIS — M25.562 ACUTE PAIN OF LEFT KNEE: ICD-10-CM

## 2022-10-22 DIAGNOSIS — S80.212A ABRASION OF LEFT KNEE, INITIAL ENCOUNTER: ICD-10-CM

## 2022-10-22 DIAGNOSIS — S63.502A WRIST SPRAIN, LEFT, INITIAL ENCOUNTER: ICD-10-CM

## 2022-10-22 DIAGNOSIS — M79.641 BILATERAL HAND PAIN: Primary | ICD-10-CM

## 2022-10-22 LAB
ANION GAP SERPL CALCULATED.3IONS-SCNC: 11 MMOL/L (ref 5–15)
APTT PPP: 29.3 SECONDS (ref 61–76.5)
BASOPHILS # BLD AUTO: 0 10*3/MM3 (ref 0–0.2)
BASOPHILS NFR BLD AUTO: 0.5 % (ref 0–1.5)
BUN SERPL-MCNC: 19 MG/DL (ref 6–20)
BUN/CREAT SERPL: 19.2 (ref 7–25)
CALCIUM SPEC-SCNC: 9.5 MG/DL (ref 8.6–10.5)
CHLORIDE SERPL-SCNC: 104 MMOL/L (ref 98–107)
CO2 SERPL-SCNC: 26 MMOL/L (ref 22–29)
CREAT SERPL-MCNC: 0.99 MG/DL (ref 0.76–1.27)
DEPRECATED RDW RBC AUTO: 43.8 FL (ref 37–54)
EGFRCR SERPLBLD CKD-EPI 2021: 107.7 ML/MIN/1.73
EOSINOPHIL # BLD AUTO: 0.5 10*3/MM3 (ref 0–0.4)
EOSINOPHIL NFR BLD AUTO: 5.6 % (ref 0.3–6.2)
ERYTHROCYTE [DISTWIDTH] IN BLOOD BY AUTOMATED COUNT: 13 % (ref 12.3–15.4)
GLUCOSE SERPL-MCNC: 103 MG/DL (ref 65–99)
HCT VFR BLD AUTO: 46.9 % (ref 37.5–51)
HGB BLD-MCNC: 16 G/DL (ref 13–17.7)
INR PPP: 1.01 (ref 0.93–1.1)
LYMPHOCYTES # BLD AUTO: 2.3 10*3/MM3 (ref 0.7–3.1)
LYMPHOCYTES NFR BLD AUTO: 26.7 % (ref 19.6–45.3)
MCH RBC QN AUTO: 31.6 PG (ref 26.6–33)
MCHC RBC AUTO-ENTMCNC: 34 G/DL (ref 31.5–35.7)
MCV RBC AUTO: 92.8 FL (ref 79–97)
MONOCYTES # BLD AUTO: 0.8 10*3/MM3 (ref 0.1–0.9)
MONOCYTES NFR BLD AUTO: 8.9 % (ref 5–12)
NEUTROPHILS NFR BLD AUTO: 5.1 10*3/MM3 (ref 1.7–7)
NEUTROPHILS NFR BLD AUTO: 58.3 % (ref 42.7–76)
NRBC BLD AUTO-RTO: 0.1 /100 WBC (ref 0–0.2)
PLATELET # BLD AUTO: 299 10*3/MM3 (ref 140–450)
PMV BLD AUTO: 8.8 FL (ref 6–12)
POTASSIUM SERPL-SCNC: 4.2 MMOL/L (ref 3.5–5.2)
PROTHROMBIN TIME: 10.4 SECONDS (ref 9.6–11.7)
RBC # BLD AUTO: 5.06 10*6/MM3 (ref 4.14–5.8)
SODIUM SERPL-SCNC: 141 MMOL/L (ref 136–145)
WBC NRBC COR # BLD: 8.7 10*3/MM3 (ref 3.4–10.8)

## 2022-10-22 PROCEDURE — 96375 TX/PRO/DX INJ NEW DRUG ADDON: CPT

## 2022-10-22 PROCEDURE — 85730 THROMBOPLASTIN TIME PARTIAL: CPT | Performed by: PHYSICIAN ASSISTANT

## 2022-10-22 PROCEDURE — 96374 THER/PROPH/DIAG INJ IV PUSH: CPT

## 2022-10-22 PROCEDURE — 96376 TX/PRO/DX INJ SAME DRUG ADON: CPT

## 2022-10-22 PROCEDURE — 85025 COMPLETE CBC W/AUTO DIFF WBC: CPT | Performed by: PHYSICIAN ASSISTANT

## 2022-10-22 PROCEDURE — 73090 X-RAY EXAM OF FOREARM: CPT

## 2022-10-22 PROCEDURE — 25010000002 ONDANSETRON PER 1 MG: Performed by: PHYSICIAN ASSISTANT

## 2022-10-22 PROCEDURE — 99284 EMERGENCY DEPT VISIT MOD MDM: CPT

## 2022-10-22 PROCEDURE — 73562 X-RAY EXAM OF KNEE 3: CPT

## 2022-10-22 PROCEDURE — 85610 PROTHROMBIN TIME: CPT | Performed by: PHYSICIAN ASSISTANT

## 2022-10-22 PROCEDURE — 71260 CT THORAX DX C+: CPT

## 2022-10-22 PROCEDURE — 0 IOPAMIDOL PER 1 ML: Performed by: EMERGENCY MEDICINE

## 2022-10-22 PROCEDURE — 80048 BASIC METABOLIC PNL TOTAL CA: CPT | Performed by: PHYSICIAN ASSISTANT

## 2022-10-22 PROCEDURE — 73130 X-RAY EXAM OF HAND: CPT

## 2022-10-22 PROCEDURE — 25010000002 KETOROLAC TROMETHAMINE PER 15 MG: Performed by: PHYSICIAN ASSISTANT

## 2022-10-22 PROCEDURE — 25010000002 MORPHINE PER 10 MG: Performed by: PHYSICIAN ASSISTANT

## 2022-10-22 PROCEDURE — 99285 EMERGENCY DEPT VISIT HI MDM: CPT

## 2022-10-22 RX ORDER — SODIUM CHLORIDE 0.9 % (FLUSH) 0.9 %
10 SYRINGE (ML) INJECTION AS NEEDED
Status: DISCONTINUED | OUTPATIENT
Start: 2022-10-22 | End: 2022-10-22 | Stop reason: HOSPADM

## 2022-10-22 RX ORDER — NAPROXEN 375 MG/1
375 TABLET ORAL 2 TIMES DAILY PRN
Qty: 14 TABLET | Refills: 0 | Status: SHIPPED | OUTPATIENT
Start: 2022-10-22 | End: 2022-10-22

## 2022-10-22 RX ORDER — KETOROLAC TROMETHAMINE 15 MG/ML
15 INJECTION, SOLUTION INTRAMUSCULAR; INTRAVENOUS ONCE
Status: COMPLETED | OUTPATIENT
Start: 2022-10-22 | End: 2022-10-22

## 2022-10-22 RX ORDER — ONDANSETRON 2 MG/ML
4 INJECTION INTRAMUSCULAR; INTRAVENOUS ONCE
Status: COMPLETED | OUTPATIENT
Start: 2022-10-22 | End: 2022-10-22

## 2022-10-22 RX ORDER — IBUPROFEN 800 MG/1
800 TABLET ORAL EVERY 6 HOURS PRN
Qty: 30 TABLET | Refills: 0 | Status: SHIPPED | OUTPATIENT
Start: 2022-10-22

## 2022-10-22 RX ADMIN — IOPAMIDOL 100 ML: 755 INJECTION, SOLUTION INTRAVENOUS at 19:36

## 2022-10-22 RX ADMIN — MORPHINE SULFATE 4 MG: 4 INJECTION INTRAVENOUS at 18:22

## 2022-10-22 RX ADMIN — MORPHINE SULFATE 4 MG: 4 INJECTION INTRAVENOUS at 19:13

## 2022-10-22 RX ADMIN — ONDANSETRON 4 MG: 2 INJECTION INTRAMUSCULAR; INTRAVENOUS at 18:22

## 2022-10-22 RX ADMIN — KETOROLAC TROMETHAMINE 15 MG: 15 INJECTION, SOLUTION INTRAMUSCULAR; INTRAVENOUS at 21:20
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
## 2022-10-23 NOTE — DISCHARGE INSTRUCTIONS
Apply ice to painful areas for 20 minutes time for the neck 72 hours up with pain and swelling.  Wear wrist splint and knee sleeve for the next 3 to 5 days as needed for pain.  If your wrist pain continues you may need a repeat x-ray in 10 days this can be ordered through your primary care or orthopedist.    Take pain medication as directed.    Follow-up with your primary care provider in 3-5 days.  If you do not have a primary care provider call [phone] for help in finding one, or you may follow up with [facility] at [phone].    Return to ED for any new or worsening symptoms

## 2022-11-17 ENCOUNTER — OFFICE VISIT (OUTPATIENT)
Dept: FAMILY MEDICINE CLINIC | Facility: CLINIC | Age: 27
End: 2022-11-17

## 2022-11-17 VITALS
DIASTOLIC BLOOD PRESSURE: 79 MMHG | BODY MASS INDEX: 24.49 KG/M2 | OXYGEN SATURATION: 97 % | HEART RATE: 100 BPM | RESPIRATION RATE: 20 BRPM | TEMPERATURE: 98.9 F | SYSTOLIC BLOOD PRESSURE: 115 MMHG | WEIGHT: 161.6 LBS | HEIGHT: 68 IN

## 2022-11-17 DIAGNOSIS — J02.9 SORE THROAT: ICD-10-CM

## 2022-11-17 DIAGNOSIS — M54.50 CHRONIC LEFT-SIDED LOW BACK PAIN WITHOUT SCIATICA: Primary | ICD-10-CM

## 2022-11-17 DIAGNOSIS — G89.29 CHRONIC LEFT-SIDED LOW BACK PAIN WITHOUT SCIATICA: Primary | ICD-10-CM

## 2022-11-17 LAB
EXPIRATION DATE: NORMAL
INTERNAL CONTROL: NORMAL
Lab: NORMAL
S PYO AG THROAT QL: NEGATIVE

## 2022-11-17 PROCEDURE — 87880 STREP A ASSAY W/OPTIC: CPT | Performed by: NURSE PRACTITIONER

## 2022-11-17 PROCEDURE — 99214 OFFICE O/P EST MOD 30 MIN: CPT | Performed by: NURSE PRACTITIONER

## 2022-11-17 RX ORDER — NAPROXEN 375 MG/1
TABLET ORAL
COMMUNITY
Start: 2022-10-24 | End: 2022-11-17

## 2022-11-17 RX ORDER — IBUPROFEN 600 MG/1
600 TABLET ORAL EVERY 6 HOURS PRN
Qty: 60 TABLET | Refills: 0 | Status: SHIPPED | OUTPATIENT
Start: 2022-11-17
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
## 2023-01-06 ENCOUNTER — OFFICE VISIT (OUTPATIENT)
Dept: FAMILY MEDICINE CLINIC | Facility: CLINIC | Age: 28
End: 2023-01-06
Payer: MEDICAID

## 2023-01-06 VITALS
OXYGEN SATURATION: 100 % | BODY MASS INDEX: 24.55 KG/M2 | HEART RATE: 81 BPM | TEMPERATURE: 97.4 F | WEIGHT: 162 LBS | SYSTOLIC BLOOD PRESSURE: 121 MMHG | HEIGHT: 68 IN | DIASTOLIC BLOOD PRESSURE: 80 MMHG

## 2023-01-06 DIAGNOSIS — R20.2 PARESTHESIA: Primary | ICD-10-CM

## 2023-01-06 DIAGNOSIS — G40.909 NONINTRACTABLE EPILEPSY WITHOUT STATUS EPILEPTICUS, UNSPECIFIED EPILEPSY TYPE: ICD-10-CM

## 2023-01-06 PROCEDURE — 99213 OFFICE O/P EST LOW 20 MIN: CPT | Performed by: NURSE PRACTITIONER

## 2023-01-06 NOTE — PROGRESS NOTES
Subjective        Lee Peck is a 27 y.o. male.     Chief Complaint   Patient presents with   • Tingling     Tingling LUE and R foot when laying in bed at night       History of Present Illness  Patient is here for tingling right foot top foot. He said he punches his foot and said he can't sleep because of it.   He has been taking tylenol pm. At least 2 every night.   He also has tingling LUE that happens at the same time.   He said feels like a numbing like asleep. He has sat in tub 2-3 hrs but when he gets out it stops.  He has had seizures in past he smokes weeds helps with that. Has not had seizure aug 2022.   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       The following portions of the patient's history were reviewed and updated as appropriate: allergies, current medications, past family history, past medical history, past social history, past surgical history and problem list.      Current Outpatient Medications:   •  ibuprofen (ADVIL,MOTRIN) 600 MG tablet, Take 1 tablet by mouth Every 6 (Six) Hours As Needed for Mild Pain., Disp: 60 tablet, Rfl: 0  •  levETIRAcetam (KEPPRA) 500 MG tablet, Take 1 tablet by mouth 2 (Two) Times a Day., Disp: 30 tablet, Rfl: 0  •  omeprazole (priLOSEC) 20 MG capsule, Take 1 capsule by mouth 2 (Two) Times a Day., Disp: 60 capsule, Rfl: 1  •  ibuprofen (ADVIL,MOTRIN) 800 MG tablet, Take 1 tablet by mouth Every 6 (Six) Hours As Needed for Moderate Pain., Disp: 30 tablet, Rfl: 0    Recent Results (from the past 4032 hour(s))   Basic Metabolic Panel    Collection Time: 10/22/22  6:22 PM    Specimen: Blood   Result Value Ref Range    Glucose 103 (H) 65 - 99 mg/dL    BUN 19 6 - 20 mg/dL    Creatinine 0.99 0.76 - 1.27 mg/dL    Sodium 141 136 - 145 mmol/L    Potassium 4.2 3.5 - 5.2 mmol/L    Chloride 104 98 - 107 mmol/L    CO2 26.0 22.0 - 29.0 mmol/L    Calcium 9.5 8.6 - 10.5 mg/dL    BUN/Creatinine Ratio 19.2 7.0 - 25.0    Anion Gap 11.0 5.0 - 15.0 mmol/L    eGFR 107.7  >60.0 mL/min/1.73   Protime-INR    Collection Time: 10/22/22  6:22 PM    Specimen: Blood   Result Value Ref Range    Protime 10.4 9.6 - 11.7 Seconds    INR 1.01 0.93 - 1.10   aPTT    Collection Time: 10/22/22  6:22 PM    Specimen: Blood   Result Value Ref Range    PTT 29.3 (L) 61.0 - 76.5 seconds   CBC Auto Differential    Collection Time: 10/22/22  6:22 PM    Specimen: Blood   Result Value Ref Range    WBC 8.70 3.40 - 10.80 10*3/mm3    RBC 5.06 4.14 - 5.80 10*6/mm3    Hemoglobin 16.0 13.0 - 17.7 g/dL    Hematocrit 46.9 37.5 - 51.0 %    MCV 92.8 79.0 - 97.0 fL    MCH 31.6 26.6 - 33.0 pg    MCHC 34.0 31.5 - 35.7 g/dL    RDW 13.0 12.3 - 15.4 %    RDW-SD 43.8 37.0 - 54.0 fl    MPV 8.8 6.0 - 12.0 fL    Platelets 299 140 - 450 10*3/mm3    Neutrophil % 58.3 42.7 - 76.0 %    Lymphocyte % 26.7 19.6 - 45.3 %    Monocyte % 8.9 5.0 - 12.0 %    Eosinophil % 5.6 0.3 - 6.2 %    Basophil % 0.5 0.0 - 1.5 %    Neutrophils, Absolute 5.10 1.70 - 7.00 10*3/mm3    Lymphocytes, Absolute 2.30 0.70 - 3.10 10*3/mm3    Monocytes, Absolute 0.80 0.10 - 0.90 10*3/mm3    Eosinophils, Absolute 0.50 (H) 0.00 - 0.40 10*3/mm3    Basophils, Absolute 0.00 0.00 - 0.20 10*3/mm3    nRBC 0.1 0.0 - 0.2 /100 WBC   POCT rapid strep A    Collection Time: 11/17/22  3:47 PM    Specimen: Swab   Result Value Ref Range    Rapid Strep A Screen Negative Negative, VALID, INVALID, Not Performed    Internal Control Passed Passed    Lot Number [id number]     Expiration Date 103,123          Review of Systems    Objective     /80 (BP Location: Left arm, Patient Position: Sitting, Cuff Size: Adult)   Pulse 81   Temp 97.4 °F (36.3 °C) (Infrared)   Ht 172.7 cm (68\")   Wt 73.5 kg (162 lb)   SpO2 100%   BMI 24.63 kg/m²     Physical Exam  Vitals and nursing note reviewed.   Constitutional:       Appearance: Normal appearance.   HENT:      Head: Normocephalic.      Right Ear: External ear normal.      Left Ear: External ear normal.      Nose: Nose normal.       Mouth/Throat:      Mouth: Mucous membranes are moist.   Eyes:      Pupils: Pupils are equal, round, and reactive to light.   Cardiovascular:      Rate and Rhythm: Normal rate and regular rhythm.      Pulses: Normal pulses.      Heart sounds: Normal heart sounds.   Pulmonary:      Effort: Pulmonary effort is normal.      Breath sounds: Normal breath sounds.   Abdominal:      Palpations: Abdomen is soft.   Musculoskeletal:         General: Normal range of motion.      Cervical back: Neck supple. No tenderness.   Lymphadenopathy:      Cervical: No cervical adenopathy.   Skin:     General: Skin is warm.      Capillary Refill: Capillary refill takes less than 2 seconds.   Neurological:      General: No focal deficit present.      Mental Status: He is alert and oriented to person, place, and time.      GCS: GCS eye subscore is 4. GCS verbal subscore is 5. GCS motor subscore is 6.      Cranial Nerves: Cranial nerves 2-12 are intact.      Sensory: Sensation is intact.      Motor: Motor function is intact. No weakness, tremor, atrophy, abnormal muscle tone or pronator drift.      Gait: Gait is intact.      Deep Tendon Reflexes:      Reflex Scores:       Tricep reflexes are 2+ on the right side and 2+ on the left side.       Bicep reflexes are 2+ on the right side and 2+ on the left side.       Brachioradialis reflexes are 2+ on the right side and 2+ on the left side.       Patellar reflexes are 2+ on the right side and 2+ on the left side.       Achilles reflexes are 2+ on the right side and 2+ on the left side.  Psychiatric:         Mood and Affect: Mood normal.         Behavior: Behavior normal.         Thought Content: Thought content normal.         Judgment: Judgment normal.         Result Review :                Assessment & Plan    Diagnoses and all orders for this visit:    1. Paresthesia (Primary)  -     Cancel: Ambulatory Referral to Neurology  -     Ambulatory Referral to Neurology    2. Nonintractable epilepsy  without status epilepticus, unspecified epilepsy type (HCC)  Comments:  stable last seizure was 8/2022      Patient Instructions   Contact the office if you don't hear from neurology.  Avoid taking 2 tylenol pm's. Take one and try low dose of melatonin.       Follow Up   Return if symptoms worsen or fail to improve.    Patient was given instructions and counseling regarding his condition or for health maintenance advice. Please see specific information pulled into the AVS if appropriate.     Sara Betancur, APRN    01/09/23

## 2023-01-09 NOTE — PATIENT INSTRUCTIONS
Contact the office if you don't hear from neurology.  Avoid taking 2 tylenol pm's. Take one and try low dose of melatonin.

## 2023-04-17 ENCOUNTER — HOSPITAL ENCOUNTER (EMERGENCY)
Facility: HOSPITAL | Age: 28
Discharge: LEFT BEFORE DISCHARGE | End: 2023-04-17
Attending: EMERGENCY MEDICINE
Payer: MEDICAID

## 2023-04-17 PROCEDURE — 99211 OFF/OP EST MAY X REQ PHY/QHP: CPT | Performed by: EMERGENCY MEDICINE

## 2023-06-07 ENCOUNTER — HOSPITAL ENCOUNTER (EMERGENCY)
Facility: HOSPITAL | Age: 28
Discharge: HOME | End: 2023-06-07
Attending: EMERGENCY MEDICINE
Payer: MEDICAID

## 2023-06-07 VITALS
DIASTOLIC BLOOD PRESSURE: 39 MMHG | OXYGEN SATURATION: 95 % | HEART RATE: 52 BPM | BODY MASS INDEX: 24.86 KG/M2 | SYSTOLIC BLOOD PRESSURE: 99 MMHG | WEIGHT: 164 LBS | RESPIRATION RATE: 15 BRPM | HEIGHT: 68 IN | TEMPERATURE: 98.8 F

## 2023-06-07 DIAGNOSIS — T63.301A SPIDER BITE WOUND, ACCIDENTAL OR UNINTENTIONAL, INITIAL ENCOUNTER: ICD-10-CM

## 2023-06-07 DIAGNOSIS — R20.2 PARESTHESIA: ICD-10-CM

## 2023-06-07 DIAGNOSIS — R11.2 NAUSEA AND VOMITING, UNSPECIFIED VOMITING TYPE: Primary | ICD-10-CM

## 2023-06-07 LAB
ALBUMIN SERPL-MCNC: 4.4 G/DL (ref 3.5–5.2)
ALBUMIN/GLOB SERPL: 1.9 G/DL
ALP SERPL-CCNC: 64 U/L (ref 39–117)
ALT SERPL W P-5'-P-CCNC: 13 U/L (ref 1–41)
ANION GAP SERPL CALCULATED.3IONS-SCNC: 12 MMOL/L (ref 5–15)
AST SERPL-CCNC: 18 U/L (ref 1–40)
BASOPHILS # BLD AUTO: 0.1 10*3/MM3 (ref 0–0.2)
BASOPHILS NFR BLD AUTO: 0.9 % (ref 0–1.5)
BILIRUB SERPL-MCNC: 0.2 MG/DL (ref 0–1.2)
BUN SERPL-MCNC: 19 MG/DL (ref 6–20)
BUN/CREAT SERPL: 21.8 (ref 7–25)
CALCIUM SPEC-SCNC: 9.5 MG/DL (ref 8.6–10.5)
CHLORIDE SERPL-SCNC: 106 MMOL/L (ref 98–107)
CK SERPL-CCNC: 339 U/L (ref 20–200)
CO2 SERPL-SCNC: 24 MMOL/L (ref 22–29)
CREAT SERPL-MCNC: 0.87 MG/DL (ref 0.76–1.27)
DEPRECATED RDW RBC AUTO: 45.1 FL (ref 37–54)
EGFRCR SERPLBLD CKD-EPI 2021: 121.3 ML/MIN/1.73
EOSINOPHIL # BLD AUTO: 0.7 10*3/MM3 (ref 0–0.4)
EOSINOPHIL NFR BLD AUTO: 7.7 % (ref 0.3–6.2)
ERYTHROCYTE [DISTWIDTH] IN BLOOD BY AUTOMATED COUNT: 13.1 % (ref 12.3–15.4)
GLOBULIN UR ELPH-MCNC: 2.3 GM/DL
GLUCOSE SERPL-MCNC: 104 MG/DL (ref 65–99)
HCT VFR BLD AUTO: 41.8 % (ref 37.5–51)
HGB BLD-MCNC: 14 G/DL (ref 13–17.7)
LYMPHOCYTES # BLD AUTO: 3.3 10*3/MM3 (ref 0.7–3.1)
LYMPHOCYTES NFR BLD AUTO: 37.2 % (ref 19.6–45.3)
MAGNESIUM SERPL-MCNC: 2 MG/DL (ref 1.6–2.6)
MCH RBC QN AUTO: 31.8 PG (ref 26.6–33)
MCHC RBC AUTO-ENTMCNC: 33.5 G/DL (ref 31.5–35.7)
MCV RBC AUTO: 95 FL (ref 79–97)
MONOCYTES # BLD AUTO: 0.8 10*3/MM3 (ref 0.1–0.9)
MONOCYTES NFR BLD AUTO: 9.6 % (ref 5–12)
NEUTROPHILS NFR BLD AUTO: 4 10*3/MM3 (ref 1.7–7)
NEUTROPHILS NFR BLD AUTO: 44.6 % (ref 42.7–76)
NRBC BLD AUTO-RTO: 0.1 /100 WBC (ref 0–0.2)
PLATELET # BLD AUTO: 239 10*3/MM3 (ref 140–450)
PMV BLD AUTO: 9.1 FL (ref 6–12)
POTASSIUM SERPL-SCNC: 4 MMOL/L (ref 3.5–5.2)
PROT SERPL-MCNC: 6.7 G/DL (ref 6–8.5)
RBC # BLD AUTO: 4.41 10*6/MM3 (ref 4.14–5.8)
SODIUM SERPL-SCNC: 142 MMOL/L (ref 136–145)
TSH SERPL DL<=0.05 MIU/L-ACNC: 2.57 UIU/ML (ref 0.27–4.2)
WBC NRBC COR # BLD: 8.9 10*3/MM3 (ref 3.4–10.8)

## 2023-06-07 PROCEDURE — 99283 EMERGENCY DEPT VISIT LOW MDM: CPT

## 2023-06-07 PROCEDURE — 25010000002 ONDANSETRON PER 1 MG: Performed by: EMERGENCY MEDICINE

## 2023-06-07 PROCEDURE — 80050 GENERAL HEALTH PANEL: CPT | Performed by: EMERGENCY MEDICINE

## 2023-06-07 PROCEDURE — 96374 THER/PROPH/DIAG INJ IV PUSH: CPT

## 2023-06-07 PROCEDURE — 82550 ASSAY OF CK (CPK): CPT | Performed by: EMERGENCY MEDICINE

## 2023-06-07 PROCEDURE — 83735 ASSAY OF MAGNESIUM: CPT | Performed by: EMERGENCY MEDICINE

## 2023-06-07 RX ORDER — ONDANSETRON 2 MG/ML
4 INJECTION INTRAMUSCULAR; INTRAVENOUS ONCE
Status: COMPLETED | OUTPATIENT
Start: 2023-06-07 | End: 2023-06-07

## 2023-06-07 RX ORDER — ONDANSETRON 4 MG/1
4 TABLET, ORALLY DISINTEGRATING ORAL EVERY 6 HOURS PRN
Qty: 12 TABLET | Refills: 0 | Status: SHIPPED | OUTPATIENT
Start: 2023-06-07

## 2023-06-07 RX ORDER — SODIUM CHLORIDE 0.9 % (FLUSH) 0.9 %
10 SYRINGE (ML) INJECTION AS NEEDED
Status: DISCONTINUED | OUTPATIENT
Start: 2023-06-07 | End: 2023-06-07 | Stop reason: HOSPADM

## 2023-06-07 RX ADMIN — ONDANSETRON 4 MG: 2 INJECTION INTRAMUSCULAR; INTRAVENOUS at 02:21

## 2023-06-07 RX ADMIN — SODIUM CHLORIDE, POTASSIUM CHLORIDE, SODIUM LACTATE AND CALCIUM CHLORIDE 1000 ML: 600; 310; 30; 20 INJECTION, SOLUTION INTRAVENOUS at 02:21

## 2023-06-07 NOTE — DISCHARGE INSTRUCTIONS
Clear liquids next 12 hours advance slowly.  Zofran sent to pharmacy.  Return for vomiting blood black or bloody stool increasing pain red hot swollen wound red streaks foul odor ulceration weakness paralysis increasing numbness or tingling unable to even talk walk or function normally or any other new or worse problems or concerns

## 2023-06-07 NOTE — ED PROVIDER NOTES
"Subjective   History of Present Illness  Chief complaint bit by a spider on my knee now have nausea vomiting feel numb around the lips    History of present illness this is a 28-year-old male who states he was camping around midnight apparently got bit by some sort of yellowish-blue purplish spider on the left leg.  He states it welt up and then went down.  But he developed some nausea vomited 3 times and felt numb around the lips.  Denies any chest pain neck arm jaw pain or shortness of breath or fever chills no diarrhea no one else in the home with similar illness.  No foreign travels.  No urinary complaints.  States now he just feels nauseous.  No generalized paralysis.  No loss of bladder bowel control    Review of Systems   Constitutional:  Negative for chills and fever.   Respiratory:  Negative for chest tightness and shortness of breath.    Cardiovascular:  Negative for chest pain and palpitations.   Gastrointestinal:  Positive for abdominal pain, nausea and vomiting.   Endocrine: Negative for cold intolerance and heat intolerance.   Musculoskeletal:  Negative for back pain and neck pain.   Skin:  Positive for wound. Negative for rash.   Neurological:  Positive for light-headedness and numbness. Negative for dizziness, speech difficulty and headaches.   Psychiatric/Behavioral:  Negative for confusion.      Past Medical History:   Diagnosis Date    GERD (gastroesophageal reflux disease)     Low back pain     Seizures        Allergies   Allergen Reactions    Tramadol Nausea And Vomiting     \"shakey\"       No past surgical history on file.    Family History   Problem Relation Age of Onset    COPD Mother     VIVIANA disease Father        Social History     Socioeconomic History    Marital status:    Tobacco Use    Smoking status: Every Day     Packs/day: 0.25     Years: 8.00     Pack years: 2.00     Types: Cigarettes, Electronic Cigarette     Last attempt to quit: 2022     Years since quittin.9    " Smokeless tobacco: Never    Tobacco comments:     Every day smoker   Vaping Use    Vaping Use: Former    Substances: Nicotine, CBD, Flavoring, Energy drink vapor    Devices: Refillable tank    Passive vaping exposure: Yes   Substance and Sexual Activity    Alcohol use: Yes     Comment: socially- drinks when back hurts    Drug use: Yes     Frequency: 4.0 times per week     Types: Marijuana     Comment: CBD pen- occ.    Sexual activity: Defer     Prior to Admission medications    Medication Sig Start Date End Date Taking? Authorizing Provider   ibuprofen (ADVIL,MOTRIN) 600 MG tablet Take 1 tablet by mouth Every 6 (Six) Hours As Needed for Mild Pain. 11/17/22   Sara Betancur APRN   ibuprofen (ADVIL,MOTRIN) 800 MG tablet Take 1 tablet by mouth Every 6 (Six) Hours As Needed for Moderate Pain. 10/22/22   Danae Sparrow PA   levETIRAcetam (KEPPRA) 500 MG tablet Take 1 tablet by mouth 2 (Two) Times a Day. 10/2/21   Rommel Almonte MD   omeprazole (priLOSEC) 20 MG capsule Take 1 capsule by mouth 2 (Two) Times a Day. 6/9/20   Jaquelin Starkey MD   ondansetron ODT (ZOFRAN-ODT) 4 MG disintegrating tablet Place 1 tablet on the tongue Every 6 (Six) Hours As Needed for Vomiting or Nausea. 6/7/23   Rommel Almonte MD          Objective   Physical Exam  Constitutional this is a 28-year-old male awake alert no acute distress triage vital signs have been reviewed.  HEENT extraocular muscles are intact pupils equal round reactive sclera clear is no photophobia mouth clear.  Neck supple no adenopathy no JVD no bruits.  Lungs clear no retractions heart regular without murmur abdomen soft nontender good bowel sounds no peritoneal findings or pulsatile masses extremities pulses equal upper lower extremities no edema cords or Homans' sign or evidence of DVT.  Patient has small puncture wound to the left leg area is not red or hot or ulcerated no signs of infection.  No crepitus subcutaneous air no pain or worsening exam.  Skin  warm and dry without any other cellulitic changes neurologic awake alert orientated x4 no facial asymmetry speech normal tongue soft palate normal speech normal no diplopia.  No drift the arms or legs reflexes 2+ symmetrical throughout upper and lower extremities toes at the ankle and big toe dorsiflex plantarflex at difficulty no clonus motor strength all normal.  No other rash.  No petechiae no purpura  Procedures           ED Course      Results for orders placed or performed during the hospital encounter of 06/07/23   Comprehensive Metabolic Panel    Specimen: Blood   Result Value Ref Range    Glucose 104 (H) 65 - 99 mg/dL    BUN 19 6 - 20 mg/dL    Creatinine 0.87 0.76 - 1.27 mg/dL    Sodium 142 136 - 145 mmol/L    Potassium 4.0 3.5 - 5.2 mmol/L    Chloride 106 98 - 107 mmol/L    CO2 24.0 22.0 - 29.0 mmol/L    Calcium 9.5 8.6 - 10.5 mg/dL    Total Protein 6.7 6.0 - 8.5 g/dL    Albumin 4.4 3.5 - 5.2 g/dL    ALT (SGPT) 13 1 - 41 U/L    AST (SGOT) 18 1 - 40 U/L    Alkaline Phosphatase 64 39 - 117 U/L    Total Bilirubin 0.2 0.0 - 1.2 mg/dL    Globulin 2.3 gm/dL    A/G Ratio 1.9 g/dL    BUN/Creatinine Ratio 21.8 7.0 - 25.0    Anion Gap 12.0 5.0 - 15.0 mmol/L    eGFR 121.3 >60.0 mL/min/1.73   Magnesium    Specimen: Blood   Result Value Ref Range    Magnesium 2.0 1.6 - 2.6 mg/dL   CK    Specimen: Blood   Result Value Ref Range    Creatine Kinase 339 (H) 20 - 200 U/L   TSH    Specimen: Blood   Result Value Ref Range    TSH 2.570 0.270 - 4.200 uIU/mL   CBC Auto Differential    Specimen: Blood   Result Value Ref Range    WBC 8.90 3.40 - 10.80 10*3/mm3    RBC 4.41 4.14 - 5.80 10*6/mm3    Hemoglobin 14.0 13.0 - 17.7 g/dL    Hematocrit 41.8 37.5 - 51.0 %    MCV 95.0 79.0 - 97.0 fL    MCH 31.8 26.6 - 33.0 pg    MCHC 33.5 31.5 - 35.7 g/dL    RDW 13.1 12.3 - 15.4 %    RDW-SD 45.1 37.0 - 54.0 fl    MPV 9.1 6.0 - 12.0 fL    Platelets 239 140 - 450 10*3/mm3    Neutrophil % 44.6 42.7 - 76.0 %    Lymphocyte % 37.2 19.6 - 45.3 %     Monocyte % 9.6 5.0 - 12.0 %    Eosinophil % 7.7 (H) 0.3 - 6.2 %    Basophil % 0.9 0.0 - 1.5 %    Neutrophils, Absolute 4.00 1.70 - 7.00 10*3/mm3    Lymphocytes, Absolute 3.30 (H) 0.70 - 3.10 10*3/mm3    Monocytes, Absolute 0.80 0.10 - 0.90 10*3/mm3    Eosinophils, Absolute 0.70 (H) 0.00 - 0.40 10*3/mm3    Basophils, Absolute 0.10 0.00 - 0.20 10*3/mm3    nRBC 0.1 0.0 - 0.2 /100 WBC     No radiology results for the last day  Medications   sodium chloride 0.9 % flush 10 mL (has no administration in time range)   lactated ringers bolus 1,000 mL (0 mL Intravenous Stopped 6/7/23 0331)   ondansetron (ZOFRAN) injection 4 mg (4 mg Intravenous Given 6/7/23 0221)                                            Medical Decision Making  Medical decision making IV established monitor placement review of sinus rhythm given a liter of lactated Ringer's and 4 Zofran IV.  The patient had labs obtained comprehensive metabolic panel was unremarkable and a sugar of 104 potassium magnesium unremarkable CK was 339 TSH normal CBC unremarkable.  Patient was feeling much better after the above measures.  No further nausea had no vomiting numbness is gone away.  I do not see evidence of an acute paralysis at this time I do not see any evidence of any acute neurological injury.  It does not sound like this was a brown recluse or a black  spider despite her description but it is difficult to say.  There is no evidence of cellulitis or ulceration at this point no evidence of suggest acute car ischemia DVT or pulmonary embolism or acute stroke.  He had no focal findings at this point.  But consider some envenomation by the spider.  We talked about signs and symptoms and when to return for any worsening understanding he was discharged home on Zofran stable unremarkable improved ER course repeat exam and was soft without tenderness no peritoneal findings and neurologic exam is unchanged otherwise    Problems Addressed:  Nausea and vomiting,  unspecified vomiting type: complicated acute illness or injury  Paresthesia: complicated acute illness or injury  Spider bite wound, accidental or unintentional, initial encounter: complicated acute illness or injury    Amount and/or Complexity of Data Reviewed  Labs: ordered. Decision-making details documented in ED Course.        Final diagnoses:   Nausea and vomiting, unspecified vomiting type   Spider bite wound, accidental or unintentional, initial encounter   Paresthesia       ED Disposition  ED Disposition       ED Disposition   Discharge    Condition   Stable    Comment   --               Sara Betancur, APRN  [address] [facility] [address] [place] [address] [phone]    In 1 day           Medication List        New Prescriptions      ondansetron ODT 4 MG disintegrating tablet  Commonly known as: ZOFRAN-ODT  Place 1 tablet on the tongue Every 6 (Six) Hours As Needed for Vomiting or Nausea.               Where to Get Your Medications        These medications were sent to [facility] [id number] - [place], [place] - [address] AT [facility] - [phone]  - [phone] FX  [address] [place] [address]      Phone: [phone]   ondansetron ODT 4 MG disintegrating tablet            Rommel Almonte MD  06/07/23 0415

## 2023-06-16 ENCOUNTER — HOSPITAL ENCOUNTER (EMERGENCY)
Facility: HOSPITAL | Age: 28
Discharge: HOME | End: 2023-06-16
Payer: MEDICAID

## 2023-06-16 VITALS
SYSTOLIC BLOOD PRESSURE: 140 MMHG | TEMPERATURE: 98 F | HEIGHT: 68 IN | WEIGHT: 165 LBS | BODY MASS INDEX: 25.01 KG/M2 | RESPIRATION RATE: 14 BRPM | HEART RATE: 78 BPM | DIASTOLIC BLOOD PRESSURE: 82 MMHG | OXYGEN SATURATION: 99 %

## 2023-06-16 DIAGNOSIS — R51.9 FACIAL PAIN: Primary | ICD-10-CM

## 2023-06-16 RX ORDER — CEPHALEXIN 500 MG/1
500 CAPSULE ORAL 3 TIMES DAILY
Qty: 30 CAPSULE | Refills: 0 | Status: SHIPPED | OUTPATIENT
Start: 2023-06-16

## 2023-06-17 NOTE — DISCHARGE INSTRUCTIONS
Take Tylenol or ibuprofen as needed for pain.  Take Keflex antibiotics to completion.    May use warm compress to the area to help with swelling or pain.  Follow-up with primary care next week for recheck    Return to the ER for new or worsening symptoms

## 2023-06-17 NOTE — ED PROVIDER NOTES
"Subjective   History of Present Illness  Chief Complaint: Facial pain, redness    Patient is a 28-year-old male history of GERD presents the ER with complaints of facial pain redness and concern for infection.  He states it has been ongoing for 6 days.  Patient states he feels like he might have an abscess.  He denies any significant swelling, no drainage or bleeding.  Denies any headache lightheadedness or dizziness.  No nausea vomiting.  Patient states that he thinks it is a boil that may have gotten infected.  No other complaints.    PCP: Sara Betancur    History provided by:  Patient    Review of Systems   Constitutional:  Negative for fever.   HENT:  Negative for facial swelling, sore throat and trouble swallowing.    Respiratory:  Negative for shortness of breath and wheezing.    Cardiovascular:  Negative for chest pain.   Gastrointestinal:  Negative for abdominal pain.   Genitourinary:  Negative for dysuria.   Skin:  Positive for color change. Negative for rash.        Erythema left cheek   Neurological:  Negative for headaches.   Psychiatric/Behavioral:  Negative for behavioral problems.    All other systems reviewed and are negative.    Past Medical History:   Diagnosis Date    GERD (gastroesophageal reflux disease)     Low back pain     Seizures        Allergies   Allergen Reactions    Tramadol Nausea And Vomiting     \"shakey\"       No past surgical history on file.    Family History   Problem Relation Age of Onset    COPD Mother     VIVIANA disease Father        Social History     Socioeconomic History    Marital status:    Tobacco Use    Smoking status: Every Day     Packs/day: 0.25     Years: 8.00     Pack years: 2.00     Types: Cigarettes, Electronic Cigarette     Last attempt to quit: 2022     Years since quittin.0    Smokeless tobacco: Never    Tobacco comments:     Every day smoker   Vaping Use    Vaping Use: Former    Substances: Nicotine, CBD, Flavoring, Energy drink vapor    Devices: " "Refillable tank    Passive vaping exposure: Yes   Substance and Sexual Activity    Alcohol use: Yes     Comment: socially- drinks when back hurts    Drug use: Yes     Frequency: 4.0 times per week     Types: Marijuana     Comment: CBD pen- occ.    Sexual activity: Defer           Objective   Physical Exam  Vitals and nursing note reviewed.   Constitutional:       General: He is not in acute distress.     Appearance: Normal appearance. He is normal weight. He is not diaphoretic.   HENT:      Head: Normocephalic and atraumatic.      Nose: Nose normal.      Mouth/Throat:      Mouth: Mucous membranes are moist.   Eyes:      Extraocular Movements: Extraocular movements intact.      Pupils: Pupils are equal, round, and reactive to light.   Cardiovascular:      Rate and Rhythm: Normal rate and regular rhythm.      Pulses: Normal pulses.      Heart sounds: Normal heart sounds. No murmur heard.  Musculoskeletal:         General: Normal range of motion.   Skin:     General: Skin is warm.      Capillary Refill: Capillary refill takes less than 2 seconds.      Findings: Erythema present. No lesion or rash.   Neurological:      General: No focal deficit present.      Mental Status: He is alert and oriented to person, place, and time.   Psychiatric:         Mood and Affect: Mood normal.         Behavior: Behavior normal.       Procedures           ED Course    /87 (BP Location: Right arm, Patient Position: Sitting)   Pulse 80   Temp 98.3 °F (36.8 °C) (Oral)   Resp 15   Ht 172.7 cm (68\")   Wt 74.8 kg (165 lb)   SpO2 97%   BMI 25.09 kg/m²   Labs Reviewed - No data to display  Medications - No data to display  No radiology results for the last day                                         Medical Decision Making  While in the ED  appropriate PPE was worn during exam and throughout all encounters with the patient.  Patient had above evaluation.  He is afebrile, nontoxic appearance in no acute distress.  Differentials " include facial abscess, boil, ulcer, acne, folliculitis.  Patient has no obvious facial swelling.  There is no drainage or bleeding.  There is no induration or central fluctuance to suggest superficial abscess to be drained at this time.  Patient be treated for cellulitis versus folliculitis.  Patient discharged with prescription for Keflex.  Patient to follow-up with primary care for recheck.    Discharge plan and instructions were discussed with the patient who verbalized understanding and is in agreement with the plan, all questions were answered at this time.  Patient is aware of signs symptoms that would require immediate return to the emergency room.  Patient understands importance of following up with primary care provider for further evaluation and worsening concerns as well as blood pressure recheck in the next 4 weeks.    Patient was discharged in improved stable condition with an upright steady gait.    Patient is aware that discharge does not mean that nothing is wrong but indicates no emergencies present and they must continue care with follow-up as given below or physician of her choice.    Problems Addressed:  Facial pain: acute illness or injury    Risk  Prescription drug management.        Final diagnoses:   Facial pain       ED Disposition  ED Disposition       ED Disposition   Discharge    Condition   Stable    Comment   --               Sara Betancur, APRN  [address] [place] [address]  [phone]    Schedule an appointment as soon as possible for a visit in 2 days  As needed, If symptoms worsen         Medication List        New Prescriptions      cephalexin 500 MG capsule  Commonly known as: KEFLEX  Take 1 capsule by mouth 3 (Three) Times a Day.               Where to Get Your Medications        These medications were sent to [facility] [id number] - [place], [place] - [address] [facility] - [phone]  - [phone] FX  [address], [facility]  [place] [address]      Phone: [phone]   cephalexin 500 MG capsule            Krystyna Padilla PA  06/16/23 3335

## 2023-10-28 ENCOUNTER — APPOINTMENT (OUTPATIENT)
Dept: GENERAL RADIOLOGY | Facility: HOSPITAL | Age: 28
End: 2023-10-28
Payer: MEDICAID

## 2023-10-28 ENCOUNTER — HOSPITAL ENCOUNTER (EMERGENCY)
Facility: HOSPITAL | Age: 28
Discharge: HOME | End: 2023-10-28
Attending: EMERGENCY MEDICINE
Payer: MEDICAID

## 2023-10-28 VITALS
OXYGEN SATURATION: 99 % | HEART RATE: 78 BPM | HEIGHT: 68 IN | RESPIRATION RATE: 16 BRPM | TEMPERATURE: 98.1 F | BODY MASS INDEX: 24.06 KG/M2 | WEIGHT: 158.73 LBS | SYSTOLIC BLOOD PRESSURE: 112 MMHG | DIASTOLIC BLOOD PRESSURE: 74 MMHG

## 2023-10-28 DIAGNOSIS — M79.671 RIGHT FOOT PAIN: Primary | ICD-10-CM

## 2023-10-28 DIAGNOSIS — L03.119 CELLULITIS OF FOOT: ICD-10-CM

## 2023-10-28 PROCEDURE — 73630 X-RAY EXAM OF FOOT: CPT

## 2023-10-28 PROCEDURE — 99283 EMERGENCY DEPT VISIT LOW MDM: CPT

## 2023-10-28 RX ORDER — CLOTRIMAZOLE 1 %
1 CREAM (GRAM) TOPICAL 2 TIMES DAILY
Qty: 12 G | Refills: 0 | Status: SHIPPED | OUTPATIENT
Start: 2023-10-28

## 2023-10-28 RX ORDER — CEPHALEXIN 500 MG/1
500 CAPSULE ORAL 3 TIMES DAILY
Qty: 30 CAPSULE | Refills: 0 | Status: SHIPPED | OUTPATIENT
Start: 2023-10-28

## 2023-10-28 NOTE — DISCHARGE INSTRUCTIONS
Take medication as prescribed.  Keep your feet clean and dry.  Follow-up with your primary care provider or podiatry if your symptoms do not improve.  Return for new or worsening symptoms.

## 2023-10-28 NOTE — ED PROVIDER NOTES
"Subjective   History of Present Illness  Patient is a 28-year-old white male who presents today with complaints of pain to his right foot.  He states he woke up this morning with pain and redness to the bottom of the right foot just at the MTP joint of the second and third toes.  He is also complains of pain and redness over the right fifth toe.  He denies any known injury or trauma.  No fever.  No open wounds.      Review of Systems   Musculoskeletal:         Right foot pain       Past Medical History:   Diagnosis Date    GERD (gastroesophageal reflux disease)     Low back pain     Seizures        Allergies   Allergen Reactions    Tramadol Nausea And Vomiting     \"shakey\"       No past surgical history on file.    Family History   Problem Relation Age of Onset    COPD Mother     VIVIANA disease Father        Social History     Socioeconomic History    Marital status:    Tobacco Use    Smoking status: Every Day     Packs/day: 0.25     Years: 8.00     Additional pack years: 0.00     Total pack years: 2.00     Types: Cigarettes, Electronic Cigarette     Last attempt to quit: 2022     Years since quittin.3    Smokeless tobacco: Never    Tobacco comments:     Every day smoker   Vaping Use    Vaping Use: Former    Substances: Nicotine, CBD, Flavoring, Energy drink vapor    Devices: Refillable tank    Passive vaping exposure: Yes   Substance and Sexual Activity    Alcohol use: Yes     Comment: socially- drinks when back hurts    Drug use: Yes     Frequency: 4.0 times per week     Types: Marijuana     Comment: CBD pen- occ.    Sexual activity: Defer           Objective   Physical Exam  Vital signs and triage nurse note reviewed.  Constitutional: Awake, alert; well-developed and well-nourished. No acute distress is noted.  Cardiovascular: Regular rate and rhythm  Pulmonary: Respiratory effort regular nonlabored  Musculoskeletal: Independent range of motion of all extremities.  Tenderness and mild erythema noted " to the plantar surface of the right foot located at the MTP joint of the second and third toes.  There are no open wounds or visible foreign body.  No crepitus.  No streaking.  No induration or fluctuance.  There is good cap refill and sensation distally.  Neuro: Alert oriented x3, speech is clear and appropriate, GCS 15.    Skin: Flesh tone, warm, dry, intact; no erythematous or petechial rash or lesion.    Procedures           ED Course      Labs Reviewed - No data to display  XR Foot 3+ View Right    Result Date: 10/28/2023  Impression: 1. Soft tissue swelling along the plantar aspect of the lateral forefoot. No evidence of underlying fracture or radiographic evidence of osteomyelitis. Electronically Signed: Anam Quesadaelor  10/28/2023 1:43 PM EDT  Workstation ID: [id number]   Medications - No data to display                                       Medical Decision Making  Patient presents today with the above complaint.    He had the above exam and evaluation.  X-rays were obtained.    Work-up: X-rays show some lateral forefoot soft tissue swelling, no evidence of fracture or osteomyelitis.    Diagnosis and treatment plan discussed with patient.  Patient agreeable to plan.   I discussed findings with patient who voices understanding of discharge instructions, signs and symptoms requiring return to ED; discharged improved and in stable condition with follow up for re-evaluation.  Prescriptions for Keflex and Lotrimin.    Amount and/or Complexity of Data Reviewed  Radiology: ordered.        Final diagnoses:   Right foot pain   Cellulitis of foot       ED Disposition  ED Disposition       ED Disposition   Discharge    Condition   Stable    Comment   --               Sara Betancur, APRN  [address] [facility] [address]  [place] [address]  [phone]      As needed    ROSE Pierre DPM  [address] [facility] [address]  [place] [address]  [phone]      As needed         Medication List        New Prescriptions       clotrimazole 1 % cream  Commonly known as: Lotrimin AF  Apply 1 application  topically to the appropriate area as directed 2 (Two) Times a Day.               Where to Get Your Medications        These medications were sent to [facility] [id number] - [place], [place] - [address] - [phone]  - [phone] FX  [address] [place] [address]      Phone: [phone]   cephalexin 500 MG capsule  clotrimazole 1 % cream            Terese Kemp APRN  10/28/23 8902

## 2023-10-28 NOTE — ED NOTES
Pt states that when he woke up this morning he noticed pain and swelling in the 5th toe of his left foot. Pt denies any injury to the area.

## 2024-01-10 ENCOUNTER — HOSPITAL ENCOUNTER (EMERGENCY)
Facility: HOSPITAL | Age: 29
Discharge: LEFT BEFORE DISCHARGE | End: 2024-01-10
Attending: EMERGENCY MEDICINE
Payer: MEDICAID

## 2024-01-10 PROCEDURE — 99211 OFF/OP EST MAY X REQ PHY/QHP: CPT | Performed by: EMERGENCY MEDICINE

## 2024-06-24 ENCOUNTER — APPOINTMENT (OUTPATIENT)
Dept: GENERAL RADIOLOGY | Facility: HOSPITAL | Age: 29
End: 2024-06-24
Payer: MEDICAID

## 2024-06-24 ENCOUNTER — HOSPITAL ENCOUNTER (EMERGENCY)
Facility: HOSPITAL | Age: 29
Discharge: HOME | End: 2024-06-24
Admitting: EMERGENCY MEDICINE
Payer: MEDICAID

## 2024-06-24 VITALS
TEMPERATURE: 98.1 F | DIASTOLIC BLOOD PRESSURE: 72 MMHG | RESPIRATION RATE: 16 BRPM | SYSTOLIC BLOOD PRESSURE: 120 MMHG | WEIGHT: 155.2 LBS | HEART RATE: 68 BPM | BODY MASS INDEX: 23.52 KG/M2 | OXYGEN SATURATION: 100 % | HEIGHT: 68 IN

## 2024-06-24 DIAGNOSIS — M79.644 FINGER PAIN, RIGHT: Primary | ICD-10-CM

## 2024-06-24 DIAGNOSIS — S63.634A SPRAIN OF INTERPHALANGEAL JOINT OF RIGHT RING FINGER, INITIAL ENCOUNTER: ICD-10-CM

## 2024-06-24 PROCEDURE — 73140 X-RAY EXAM OF FINGER(S): CPT

## 2024-06-24 PROCEDURE — 99283 EMERGENCY DEPT VISIT LOW MDM: CPT

## 2024-06-24 RX ORDER — IBUPROFEN 400 MG/1
800 TABLET ORAL ONCE
Status: COMPLETED | OUTPATIENT
Start: 2024-06-24 | End: 2024-06-24

## 2024-06-24 RX ADMIN — IBUPROFEN 800 MG: 400 TABLET, FILM COATED ORAL at 12:27

## 2024-06-24 NOTE — DISCHARGE INSTRUCTIONS
Rest, ice and elevate your right hand above your heart.  Take alternating doses of Ibuprofen and Tylenol, as directed for pain and inflammation.  PLEASE CALL [facility] TODAY TO SCHEDULE AN APPOINTMENT FOR FURTHER EVALUATION AND CARE OF YOUR RIGHT RING FINGER INJURY.

## 2024-06-24 NOTE — ED PROVIDER NOTES
"Subjective   History of Present Illness  30 y/o male presents to ER with c/o right index finger pain that started yesterday while he was swinging on a rope swing.  He reports that she fell off of the swing and right 4th digit \"got stuck on a knot of the rope\".  He states that he then fell into the water off the rope swing.  He states that it hurts to straighten finger out.      Review of Systems   Musculoskeletal:  Positive for joint swelling.        Right 4th finger pain from MCP to tip; painful and swollen.   Skin:  Positive for color change. Negative for pallor, rash and wound.       Past Medical History:   Diagnosis Date    GERD (gastroesophageal reflux disease)     Low back pain     Seizures        Allergies   Allergen Reactions    Tramadol Nausea And Vomiting     \"shakey\"       No past surgical history on file.    Family History   Problem Relation Age of Onset    COPD Mother     VIVIANA disease Father        Social History     Socioeconomic History    Marital status:    Tobacco Use    Smoking status: Every Day     Current packs/day: 0.00     Average packs/day: 0.3 packs/day for 8.0 years (2.0 ttl pk-yrs)     Types: Cigarettes, Electronic Cigarette     Start date: 2014     Last attempt to quit: 2022     Years since quittin.0    Smokeless tobacco: Never    Tobacco comments:     Every day smoker   Vaping Use    Vaping status: Former    Substances: Nicotine, CBD, Flavoring, Energy drink vapor    Devices: Refillable tank    Passive vaping exposure: Yes   Substance and Sexual Activity    Alcohol use: Yes     Comment: socially- drinks when back hurts    Drug use: Yes     Frequency: 4.0 times per week     Types: Marijuana     Comment: CBD pen- occ.    Sexual activity: Defer           Objective   Physical Exam  Constitutional:       General: He is not in acute distress.     Appearance: Normal appearance. He is not ill-appearing, toxic-appearing or diaphoretic.   HENT:      Head: Normocephalic and " atraumatic.   Musculoskeletal:      Right hand: Swelling and tenderness present. No deformity or lacerations. Decreased range of motion. Decreased strength. Normal sensation. Normal capillary refill. Normal pulse.        Arms:    Skin:     General: Skin is warm and dry.      Findings: Bruising present.   Neurological:      Mental Status: He is alert.       Procedures           ED Course      XR Finger 2+ View Right    Result Date: 6/24/2024  Impression: No acute bony process. Electronically Signed: Ericka Salvador MD  6/24/2024 12:30 PM EDT  Workstation ID: [id number]                             Medications   ibuprofen (ADVIL,MOTRIN) tablet 800 mg (800 mg Oral Given 6/24/24 1227)                Medical Decision Making  29-year-old  male presents to the emergency room with complaint of right ring finger injury that occurred yesterday.  Patient states that the pain and swelling is so bad that it hurts to straighten his right finger out.    Problems Addressed:  Finger pain, right: self-limited or minor problem     Details: Fourth upper right right distal and mid finger pain and swelling  Sprain of interphalangeal joint of right ring finger, initial encounter: self-limited or minor problem     Details: Ice pack ordered and applied and instructed patient to keep hand elevated above heart.  X-ray reveals no acute fracture.  Splinted with posterior finger splint and placed in a sling.  Referral to [facility].    Amount and/or Complexity of Data Reviewed  Radiology: ordered.     Details: XR Finger 2+ View Right    Result Date: 6/24/2024  Impression: No acute bony process. Electronically Signed: Ericka Salvador MD  6/24/2024 12:30 PM EDT  Workstation ID: [id number]     Referral to [facility] given and advised patient to follow up for further eval and care.  Patient verbalized understanding.    RICE instructions thoroughly discussed with patient and family.    Risk  OTC drugs.  Risk Details: D/c home in  splint and sling to help with RICE instructions.  Encouraged patient to take alternating doses of Tylenol and Ibuprofen, as directed.  Referral to [facility] given in d/c paperwork.          Final diagnoses:   Finger pain, right   Sprain of interphalangeal joint of right ring finger, initial encounter       ED Disposition  ED Disposition       ED Disposition   Discharge    Condition   Stable    Comment   --               Sara Betancur, APRN  [address] [facility] [address]  [facility] [address] [phone]    Schedule an appointment as soon as possible for a visit in 1 week  As needed, If symptoms worsen    [facility] [place]  [address] [place] [address] [address]  [facility] [address]  [phone]  Call in 1 day  for further eval and care of your right 4th digit finger pain.         Medication List      No changes were made to your prescriptions during this visit.            Nora Nash, APRN  06/24/24 3486

## 2024-10-02 ENCOUNTER — APPOINTMENT (OUTPATIENT)
Dept: GENERAL RADIOLOGY | Facility: HOSPITAL | Age: 29
End: 2024-10-02
Payer: MEDICAID

## 2024-10-02 ENCOUNTER — HOSPITAL ENCOUNTER (EMERGENCY)
Facility: HOSPITAL | Age: 29
Discharge: HOME | End: 2024-10-02
Payer: MEDICAID

## 2024-10-02 VITALS
SYSTOLIC BLOOD PRESSURE: 135 MMHG | OXYGEN SATURATION: 100 % | DIASTOLIC BLOOD PRESSURE: 84 MMHG | RESPIRATION RATE: 18 BRPM | TEMPERATURE: 98.5 F | BODY MASS INDEX: 23.7 KG/M2 | HEIGHT: 69 IN | WEIGHT: 160 LBS | HEART RATE: 74 BPM

## 2024-10-02 DIAGNOSIS — M25.512 ACUTE PAIN OF LEFT SHOULDER: Primary | ICD-10-CM

## 2024-10-02 PROCEDURE — 97161 PT EVAL LOW COMPLEX 20 MIN: CPT | Performed by: PHYSICAL THERAPIST

## 2024-10-02 PROCEDURE — 99283 EMERGENCY DEPT VISIT LOW MDM: CPT

## 2024-10-02 PROCEDURE — 73030 X-RAY EXAM OF SHOULDER: CPT

## 2024-10-02 RX ORDER — HYDROCODONE BITARTRATE AND ACETAMINOPHEN 5; 325 MG/1; MG/1
1 TABLET ORAL EVERY 6 HOURS PRN
Qty: 12 TABLET | Refills: 0 | Status: SHIPPED | OUTPATIENT
Start: 2024-10-02

## 2024-10-02 RX ORDER — IBUPROFEN 800 MG/1
800 TABLET, FILM COATED ORAL EVERY 6 HOURS PRN
Qty: 40 TABLET | Refills: 0 | Status: SHIPPED | OUTPATIENT
Start: 2024-10-02

## 2024-10-02 RX ORDER — HYDROCODONE BITARTRATE AND ACETAMINOPHEN 5; 325 MG/1; MG/1
1 TABLET ORAL ONCE
Status: COMPLETED | OUTPATIENT
Start: 2024-10-02 | End: 2024-10-02

## 2024-10-02 RX ADMIN — HYDROCODONE BITARTRATE AND ACETAMINOPHEN 1 TABLET: 5; 325 TABLET ORAL at 13:13

## 2024-10-02 NOTE — THERAPY EVALUATION
Patient Name: Lee Peck  : 1995    MRN: [id number]                              Today's Date: 10/2/2024       Admit Date: 10/2/2024    Visit Dx:     ICD-10-CM ICD-9-CM   1. Acute pain of left shoulder  M25.512 719.41     Patient Active Problem List   Diagnosis    Epilepsy without status epilepticus, not intractable    Low back pain    Gastroesophageal reflux disease without esophagitis    Lumbar radiculopathy    DDD (degenerative disc disease), lumbar    Macrocytic anemia    Tobacco abuse    Sleep apnea in adult    Encounter for health-related screening    Paresthesia     Past Medical History:   Diagnosis Date    GERD (gastroesophageal reflux disease)     Low back pain     Seizures      SUBJECTIVE:  Pt with left shoulder pain with no known injury, worsening over the past 3 days.   Imaging: negative for acute fx    OBJECTIVE:    AROM - minimal - 10 deg flex and abduction   PROM 20 deg flex, 20 deg abd with pain   MMT UE - unable to test d/t pain, 2/5 flex, abd, ER, IR , elbow flex   SPECIAL TESTING - unable to tolerate any special testing   PALPATION/TENDERNESS - anterior shoulder over anterior AC jt, supraspinatus insertion, and short head of bicep insertion   - no tenderness of bicep muscle or brachialis muscle   POSTURE - kyphotic   SENSATION  -intact to light touch     ASSESSMENT:   Pt presents with a diagnosis of shoulder pain of unknown etiology and has pain and decreased mobility that are limiting his ability to perform ADLs and recreational activities. Imaging negative for fx. He was not able to tolerate any special testing therefore he was given education to f/u with ortho regarding pain level and inability to move arm since we are unable to tell what tendon/ligament/muscle is involved d/t being unable to adequately position arm for testing. Placed pt in sling and given education regarding sleeping positions for pain relief.      Goals:   LTG 1: The patient will be independent in HEP in order to  decrease pain and improve tolerance to functional activities.  STATUS: Met    Interventions:   Manual Therapy: none    Therapeutic Exercises: AAROM flex supine and standing at wall, scap retraction, chin tuck     Modalities: ed for ice, no TENS d/t hx of seizures       PLAN:  home with HEP and ortho f/u        Time Calculation:   PT Evaluation Complexity  History, PT Evaluation Complexity: 1-2 personal factors and/or comorbidities  Examination of Body Systems (PT Eval Complexity): 1-2 elements  Clinical Presentation (PT Evaluation Complexity): stable  Clinical Decision Making (PT Evaluation Complexity): low complexity  Overall Complexity (PT Evaluation Complexity): low complexity     PT Charges       Row Name 10/02/24 1443             Time Calculation    Start Time 1250  -AD      Stop Time 1324  -AD      Time Calculation (min) 34 min  -AD      PT Received On 10/02/24  -AD         Time Calculation- PT    Total Timed Code Minutes- PT 0 minute(s)  -AD                User Key  (r) = Recorded By, (t) = Taken By, (c) = Cosigned By      Initials Name Provider Type    AD Karina López, PT Physical Therapist                  Therapy Charges for Today       Code Description Service Date Service Provider Modifiers Qty    99990813488  PT EVAL LOW COMPLEXITY 4 10/2/2024 Karina López, PT GP 1               PT Discharge Summary  Anticipated Discharge Disposition (PT): home    Karina López PT  10/2/2024

## 2024-10-02 NOTE — ED PROVIDER NOTES
"Subjective   History of Present Illness  29-year-old male with 3-day history of shoulder pain.  Patient states that his left shoulder began hurting without falls trauma or heavy lifting states that he has had multiple dislocations of right shoulder and is concerned that left shoulder is also dislocated at this point.  Patient has almost 0 range of motion no numbness tingling of left upper extremity reported per patient.  Patient is right-handed.        Review of Systems   Constitutional:  Negative for chills, fatigue and fever.   Musculoskeletal:  Positive for arthralgias.       Past Medical History:   Diagnosis Date    GERD (gastroesophageal reflux disease)     Low back pain     Seizures        Allergies   Allergen Reactions    Tramadol Nausea And Vomiting     \"shakey\"       History reviewed. No pertinent surgical history.    Family History   Problem Relation Age of Onset    COPD Mother     VIVIANA disease Father        Social History     Socioeconomic History    Marital status:    Tobacco Use    Smoking status: Every Day     Current packs/day: 0.00     Average packs/day: 0.3 packs/day for 8.0 years (2.0 ttl pk-yrs)     Types: Cigarettes, Electronic Cigarette     Start date: 2014     Last attempt to quit: 2022     Years since quittin.2    Smokeless tobacco: Never    Tobacco comments:     Every day smoker   Vaping Use    Vaping status: Former    Substances: Nicotine, CBD, Flavoring, Energy drink vapor    Devices: Refillable tank    Passive vaping exposure: Yes   Substance and Sexual Activity    Alcohol use: Yes     Comment: socially- drinks when back hurts    Drug use: Yes     Frequency: 4.0 times per week     Types: Marijuana     Comment: CBD pen- occ.    Sexual activity: Defer           Objective   Physical Exam  Vitals and nursing note reviewed.   Musculoskeletal:      Left shoulder: Tenderness and bony tenderness present. No swelling, deformity or crepitus. Decreased range of motion. Normal " "pulse.      Comments: Patient is tender over the anterior deltoid of left shoulder.         Procedures           ED Course    /84 (BP Location: Right arm)   Pulse 74   Temp 98.5 °F (36.9 °C) (Oral)   Resp 18   Ht 175.3 cm (69\")   Wt 72.6 kg (160 lb)   SpO2 100%   BMI 23.63 kg/m²   Labs Reviewed - No data to display  Medications   HYDROcodone-acetaminophen (NORCO) 5-325 MG per tablet 1 tablet (1 tablet Oral Given 10/2/24 1313)     XR Shoulder 2+ View Left    Result Date: 10/2/2024  Impression: No acute abnormality. Electronically Signed: Saurabh العراقي MD  10/2/2024 12:45 PM EDT  Workstation ID: [id number]                                            Medical Decision Making  29-year-old male presents emergency department with complaints of left shoulder pain for 3 days.  Patient states this is a nontraumatic issue.  Physical exam patient is point tender over anterior deltoid.  Almost 0 range of motion secondary to pain.  Vital signs are stable and normal.  ER course: X-ray of left shoulder found to be free of bony abnormality and/or dislocation.  Interpreted by Dr. Flowers.  Confirmed by radiologist.  X-ray results discussed with patient, most likely etiology is soft tissue. Given hydrocodone 5/325 1 p.o. in ER setting  For pain.  Patient's left arm placed in sling, evaluated by physical therapist in ER.  Ibuprofen 800 mg and Norco fives sent to the pharmacy for pain control, advised to follow-up with Dr. Mann in 7 to 10 days if symptoms do not improve.  Patient verbalized understand these instructions he was discharged home stable condition.  Patient's ER course treatment plan and disposition discussed with Dr. Flowers.    Amount and/or Complexity of Data Reviewed  Radiology: ordered and independent interpretation performed. Decision-making details documented in ED Course.    Risk  Prescription drug management.        Final diagnoses:   Acute pain of left shoulder       ED Disposition  ED Disposition       ED " Disposition   Discharge    Condition   Stable    Comment   --               Shiva Jeong MD  [address] [facility] [address]  [place] [address] [phone]    Schedule an appointment as soon as possible for a visit            Medication List        New Prescriptions      HYDROcodone-acetaminophen 5-325 MG per tablet  Commonly known as: NORCO  Take 1 tablet by mouth Every 6 (Six) Hours As Needed for Moderate Pain.  Replaces: HYDROcodone-acetaminophen 7.5-325 MG/15ML solution            Changed      ibuprofen 800 MG tablet  Commonly known as: ADVIL,MOTRIN  Take 1 tablet by mouth Every 6 (Six) Hours As Needed for Mild Pain.  What changed:   reasons to take this  Another medication with the same name was removed. Continue taking this medication, and follow the directions you see here.            Stop      HYDROcodone-acetaminophen 7.5-325 MG/15ML solution  Commonly known as: HYCET  Replaced by: HYDROcodone-acetaminophen 5-325 MG per tablet               Where to Get Your Medications        These medications were sent to [facility] [id number] - [place], [place] - [address] AT [facility] - [phone]  - [phone]   [address] [facility] [place] [address]      Phone: [phone]   HYDROcodone-acetaminophen 5-325 MG per tablet  ibuprofen 800 MG tablet            Dorian Fraser PA-C  10/02/24 1404

## 2024-10-02 NOTE — PLAN OF CARE
ASSESSMENT:   Pt presents with a diagnosis of shoulder pain of unknown etiology and has pain and decreased mobility that are limiting his ability to perform ADLs and recreational activities. Imaging negative for fx. He was not able to tolerate any special testing therefore he was given education to f/u with ortho regarding pain level and inability to move arm since we are unable to tell what tendon/ligament/muscle is involved d/t being unable to adequately position arm for testing. Placed pt in sling and given education regarding sleeping positions for pain relief.      Goals:   LTG 1: The patient will be independent in HEP in order to decrease pain and improve tolerance to functional activities.  STATUS: Met    Interventions:   Manual Therapy: none    Therapeutic Exercises: AAROM flex supine and standing at wall, scap retraction, chin tuck     Modalities: ed for ice, no TENS d/t hx of seizures       PLAN:  home with HEP and ortho f/u

## 2024-10-10 ENCOUNTER — HOSPITAL ENCOUNTER (EMERGENCY)
Facility: HOSPITAL | Age: 29
Discharge: HOME | End: 2024-10-10
Payer: MEDICAID

## 2024-10-10 ENCOUNTER — APPOINTMENT (OUTPATIENT)
Dept: GENERAL RADIOLOGY | Facility: HOSPITAL | Age: 29
End: 2024-10-10
Payer: MEDICAID

## 2024-10-10 VITALS
OXYGEN SATURATION: 98 % | SYSTOLIC BLOOD PRESSURE: 127 MMHG | TEMPERATURE: 98.6 F | DIASTOLIC BLOOD PRESSURE: 83 MMHG | HEART RATE: 80 BPM | WEIGHT: 163.14 LBS | RESPIRATION RATE: 20 BRPM | HEIGHT: 68 IN | BODY MASS INDEX: 24.73 KG/M2

## 2024-10-10 DIAGNOSIS — S61.412A LACERATION OF LEFT HAND WITHOUT FOREIGN BODY, INITIAL ENCOUNTER: Primary | ICD-10-CM

## 2024-10-10 PROCEDURE — 73130 X-RAY EXAM OF HAND: CPT

## 2024-10-10 PROCEDURE — 25010000002 LIDOCAINE 1 % SOLUTION

## 2024-10-10 PROCEDURE — 25010000002 HYDROMORPHONE 1 MG/ML SOLUTION

## 2024-10-10 PROCEDURE — 96372 THER/PROPH/DIAG INJ SC/IM: CPT

## 2024-10-10 PROCEDURE — 99283 EMERGENCY DEPT VISIT LOW MDM: CPT

## 2024-10-10 RX ORDER — LIDOCAINE HYDROCHLORIDE 10 MG/ML
10 INJECTION, SOLUTION INFILTRATION; PERINEURAL ONCE
Status: COMPLETED | OUTPATIENT
Start: 2024-10-10 | End: 2024-10-10

## 2024-10-10 RX ORDER — CEPHALEXIN 500 MG/1
500 CAPSULE ORAL 4 TIMES DAILY
Qty: 27 CAPSULE | Refills: 0 | Status: SHIPPED | OUTPATIENT
Start: 2024-10-10 | End: 2024-10-17

## 2024-10-10 RX ORDER — HYDROCODONE BITARTRATE AND ACETAMINOPHEN 5; 325 MG/1; MG/1
1 TABLET ORAL ONCE
Status: COMPLETED | OUTPATIENT
Start: 2024-10-10 | End: 2024-10-10

## 2024-10-10 RX ORDER — DIAPER,BRIEF,INFANT-TODD,DISP
1 EACH MISCELLANEOUS ONCE
Status: COMPLETED | OUTPATIENT
Start: 2024-10-10 | End: 2024-10-10

## 2024-10-10 RX ORDER — CEPHALEXIN 500 MG/1
500 CAPSULE ORAL ONCE
Status: COMPLETED | OUTPATIENT
Start: 2024-10-10 | End: 2024-10-10

## 2024-10-10 RX ADMIN — CEPHALEXIN 500 MG: 500 CAPSULE ORAL at 20:11

## 2024-10-10 RX ADMIN — HYDROCODONE BITARTRATE AND ACETAMINOPHEN 1 TABLET: 5; 325 TABLET ORAL at 20:11

## 2024-10-10 RX ADMIN — LIDOCAINE HYDROCHLORIDE 10 ML: 10 INJECTION, SOLUTION INFILTRATION; PERINEURAL at 18:59

## 2024-10-10 RX ADMIN — BACITRACIN 0.9 G: 500 OINTMENT TOPICAL at 20:11

## 2024-10-10 RX ADMIN — HYDROMORPHONE HYDROCHLORIDE 0.5 MG: 1 INJECTION, SOLUTION INTRAMUSCULAR; INTRAVENOUS; SUBCUTANEOUS at 18:57

## 2024-10-10 NOTE — ED NOTES
Pt states he was outside and knife went through top of hand; pt has shirt wrapped around hand at this time; bleeding seems to be under control currently; pt states pain is 10/10; pt pulled knife out of hand and bleeding started.

## 2024-10-10 NOTE — ED PROVIDER NOTES
"Subjective   History of Present Illness  29-year-old male presents the ED with complaints of a steak knife that went through the dorsal aspect of the left hand prior to arrival.  Patient reports that he was outside working when he fell backwards onto the steak knife.  Unsure how far the knife went in but states that he only thinks it went in a small amount, did not penetrate through to the palm.  Denies numbness, tingling.  States he has full range of motion of the hand however it is tender.  Reports that he is up-to-date on his tetanus shot, within the past year or two.  Denies any other complaints.  Reports occasional alcohol use, denies denies drug use    PCP: Pipe        Review of Systems   Constitutional:  Negative for fever.   Respiratory:  Negative for shortness of breath.    Cardiovascular:  Negative for chest pain.   Skin:  Positive for wound (laceration to left hand).       Past Medical History:   Diagnosis Date    GERD (gastroesophageal reflux disease)     Low back pain     Seizures        Allergies   Allergen Reactions    Tramadol Nausea And Vomiting     \"shakey\"       No past surgical history on file.    Family History   Problem Relation Age of Onset    COPD Mother     VIVIANA disease Father        Social History     Socioeconomic History    Marital status:    Tobacco Use    Smoking status: Every Day     Current packs/day: 0.00     Average packs/day: 0.3 packs/day for 8.0 years (2.0 ttl pk-yrs)     Types: Cigarettes, Electronic Cigarette     Start date: 2014     Last attempt to quit: 2022     Years since quittin.3    Smokeless tobacco: Never    Tobacco comments:     Every day smoker   Vaping Use    Vaping status: Former    Substances: Nicotine, CBD, Flavoring, Energy drink vapor    Devices: Refillable tank    Passive vaping exposure: Yes   Substance and Sexual Activity    Alcohol use: Yes     Comment: socially- drinks when back hurts    Drug use: Yes     Frequency: 4.0 times per week "     Types: Marijuana     Comment: CBD pen- occ.    Sexual activity: Defer           Objective   Physical Exam  Vitals reviewed.   HENT:      Head: Normocephalic.   Eyes:      Extraocular Movements: Extraocular movements intact.      Conjunctiva/sclera: Conjunctivae normal.   Cardiovascular:      Rate and Rhythm: Normal rate and regular rhythm.      Pulses: Normal pulses.      Heart sounds: Normal heart sounds.   Pulmonary:      Effort: Pulmonary effort is normal.      Breath sounds: Normal breath sounds.   Musculoskeletal:         General: Swelling and tenderness present.      Comments: Patient has moderate swelling to the dorsal aspect of the left hand near the laceration.  Patient had full range of motion of the left hand.  Denies numbness or tingling.  Good capillary refill and distal pulses   Skin:     General: Skin is warm.      Comments: There is a 1 cm laceration to the dorsal aspect of the left hand with minimal bleeding.  No abnormal discharge.  Mild bruising noted to the area as well.  No open area or bruising noted to the palmar aspect of left hand   Neurological:      Mental Status: He is alert and oriented to person, place, and time.   Psychiatric:         Mood and Affect: Mood normal.         Behavior: Behavior normal.         Laceration Repair    Date/Time: 10/11/2024 2:34 AM    Performed by: Kiki Moser APRN  Authorized by: Kiki Moser APRN    Consent:     Consent obtained:  Verbal    Consent given by:  Patient    Risks, benefits, and alternatives were discussed: yes      Risks discussed:  Infection, pain, poor cosmetic result and poor wound healing    Alternatives discussed:  No treatment and delayed treatment  Universal protocol:     Procedure explained and questions answered to patient or proxy's satisfaction: yes      Imaging studies available: yes      Required blood products, implants, devices, and special equipment available: no      Site/side marked: yes      Immediately prior to  "procedure, a time out was called: yes      Patient identity confirmed:  Verbally with patient and arm band  Anesthesia:     Anesthesia method:  Local infiltration    Local anesthetic:  Lidocaine 1% w/o epi  Laceration details:     Location:  Hand    Hand location:  L hand, dorsum    Length (cm):  1    Depth (mm):  0.3  Exploration:     Limited defect created (wound extended): no      Hemostasis achieved with:  Direct pressure    Imaging obtained: x-ray      Imaging outcome: foreign body not noted      Wound exploration: wound explored through full range of motion and entire depth of wound visualized      Wound extent: no foreign bodies/material noted and no nerve damage noted      Contaminated: no    Treatment:     Area cleansed with:  Povidone-iodine, chlorhexidine and saline    Amount of cleaning:  Extensive    Irrigation solution:  Sterile saline    Irrigation method:  Pressure wash  Skin repair:     Repair method:  Sutures    Suture size:  5-0    Suture material:  Nylon    Suture technique:  Simple interrupted    Number of sutures:  1  Approximation:     Approximation:  Loose  Repair type:     Repair type:  Simple  Post-procedure details:     Dressing:  Antibiotic ointment, non-adherent dressing and bulky dressing    Procedure completion:  Tolerated well, no immediate complications             ED Course  ED Course as of 10/11/24 0234   Thu Oct 10, 2024   1854 Waiting for patient to receive dilaudid prior to sutures [KB]   2009 Inspect reviewed with patient, patient had Norco 5 mg tablets filled on 10/2/24 quantity of 12 for 3 days [KB]      ED Course User Index  [KB] Kiki Moser, OSEAS      /83   Pulse 80   Temp 98.6 °F (37 °C) (Oral)   Resp 20   Ht 172.7 cm (68\")   Wt 74 kg (163 lb 2.3 oz)   SpO2 98%   BMI 24.81 kg/m²   Labs Reviewed - No data to display  Medications   lidocaine (XYLOCAINE) 1 % injection 10 mL (10 mL Injection Given 10/10/24 1859)   HYDROmorphone (DILAUDID) injection 0.5 mg (0.5 " mg Subcutaneous Given 10/10/24 1857)   bacitracin ointment 0.9 g (0.9 g Topical Given 10/10/24 2011)   HYDROcodone-acetaminophen (NORCO) 5-325 MG per tablet 1 tablet (1 tablet Oral Given 10/10/24 2011)   cephalexin (KEFLEX) capsule 500 mg (500 mg Oral Given 10/10/24 2011)     XR Hand 3+ View Left    Result Date: 10/10/2024  Impression: Negative exam. Electronically Signed: Kris James MD  10/10/2024 6:23 PM EDT  Workstation ID: [id number]                                          Medical Decision Making  Patient was seen for above complaints.  Considered lab work but not felt to be emergently warranted at this time.  Patient reported that he is up-to-date on his tetanus shot.  X-ray of the left hand was obtained and independently interpreted by the radiologist as negative exam.  The area was approximately 1 cm in length, bleeding was controlled with direct pressure.  Full visualization of the wound was assessed without foreign body noted.  There was moderate swelling beneath the skin with bruising present.  Patient was given Dilaudid subcu for the initial pain prior to suture placement.  Patient was cleansed per myself and nursing staff in depth.  It was numbed with lidocaine and 1 suture was applied to approximate the wound and help with healing.  Please see the above procedure note for this.  He was distally neurovascularly intact.  The area was then cleansed again per nursing staff dressing applied, and a nonadherent bulky applied for pressure.  Patient was given Norco tablet prior to discharge for acute pain along with Keflex for infection prophylaxis.  Instructed patient to follow-up with hand specialist as soon as possible for recheck.  Advised to alternate Tylenol ibuprofen as needed at home for the discomfort.  Was given a prescription for Keflex for infection prophylaxis on the outpatient setting.  Instructed to elevate the hand and use ice to the area for no more than 20 minutes at 1 time to help with the  swelling.  Discussed signs or symptoms of when to return to the ER.  Also instructed to follow-up with primary care to have sutures removed in 7 to 10 days.  Given instructions for proper wound care such as keeping it clean dry and covered.  Patient and family bedside verbalized understanding was agreeable plan of care at this time.    I discussed findings with patient who voices understanding of discharge instructions, signs and symptoms requiring return to ED; discharged improved and in stable condition with follow up for re-evaluation.  This document is intended for medical expert use only. Reading of this document by patients and/or patient's family without participating medical staff guidance may result in misinterpretation and unintended morbidity.  Any interpretation of such data is the responsibility of the patient and/or family member responsible for the patient in concert with their primary or specialist providers, not to be left for sources of online searches such as [place], [employer] or similar queries. Relying on these approaches to knowledge may result in misinterpretation, misguided goals of care and even death should patients or family members try recommendations outside of the realm of professional medical care in a supervised inpatient environment.     Problems Addressed:  Laceration of left hand without foreign body, initial encounter: acute illness or injury    Amount and/or Complexity of Data Reviewed  Radiology: ordered and independent interpretation performed. Decision-making details documented in ED Course.    Risk  OTC drugs.  Prescription drug management.        Final diagnoses:   Laceration of left hand without foreign body, initial encounter       ED Disposition  ED Disposition       ED Disposition   Discharge    Condition   Stable    Comment   --               Sara Betancur, APRN  [address] [address]  [phone]    Schedule an appointment as soon as possible  for a visit       [facility]  [address] [place] [address] [address]  [facility] [address]  [phone]  Schedule an appointment as soon as possible for a visit            Medication List        Changed      cephalexin 500 MG capsule  Commonly known as: KEFLEX  Take 1 capsule by mouth 4 (Four) Times a Day for 7 days.  What changed: when to take this               Where to Get Your Medications        These medications were sent to [facility] [id number] - [place], [place] - [address] AT [facility] - [phone]  - [phone]   [address] [facility] [place] [address]      Phone: [phone]   cephalexin 500 MG capsule            Kiki Moser, APRN  10/11/24 0248

## 2024-10-11 ENCOUNTER — HOSPITAL ENCOUNTER (EMERGENCY)
Facility: HOSPITAL | Age: 29
Discharge: HOME | End: 2024-10-11
Payer: MEDICAID

## 2024-10-11 VITALS
SYSTOLIC BLOOD PRESSURE: 135 MMHG | TEMPERATURE: 98.1 F | HEIGHT: 68 IN | DIASTOLIC BLOOD PRESSURE: 94 MMHG | OXYGEN SATURATION: 99 % | RESPIRATION RATE: 18 BRPM | WEIGHT: 163.14 LBS | HEART RATE: 83 BPM | BODY MASS INDEX: 24.73 KG/M2

## 2024-10-11 DIAGNOSIS — M79.89 HAND SWELLING: Primary | ICD-10-CM

## 2024-10-11 PROCEDURE — 99283 EMERGENCY DEPT VISIT LOW MDM: CPT

## 2024-10-11 RX ORDER — DIAPER,BRIEF,INFANT-TODD,DISP
1 EACH MISCELLANEOUS EVERY 12 HOURS SCHEDULED
Status: DISCONTINUED | OUTPATIENT
Start: 2024-10-11 | End: 2024-10-11

## 2024-10-11 RX ORDER — DIAPER,BRIEF,INFANT-TODD,DISP
1 EACH MISCELLANEOUS EVERY 12 HOURS SCHEDULED
Status: DISCONTINUED | OUTPATIENT
Start: 2024-10-11 | End: 2024-10-11 | Stop reason: HOSPADM

## 2024-10-11 RX ADMIN — BACITRACIN 0.9 G: 500 OINTMENT TOPICAL at 17:42

## 2024-10-11 NOTE — DISCHARGE INSTRUCTIONS
Clean wound daily with soap and water pat dry and apply antibiotic ointment.  Look for signs of infection including increased redness, purulent drainage, or fever.    Alternate Tylenol or ibuprofen as needed for pain and swelling.    Follow-up with your primary care provider in 3-5 days.  If you do not have a primary care provider call [phone] for help in finding one, or you may follow up with [facility] at [phone].    Return to ED for any new or worsening symptoms

## 2024-10-11 NOTE — DISCHARGE INSTRUCTIONS
Take all antibiotics until gone to help prevent infection.  Make sure to keep the area clean dry and covered.  Have the sutures removed in 7 to 10 days.  Make sure you are elevating the hand and use ice to the area for more no more than 20 minutes at 1 time.  May alternate Tylenol and ibuprofen as needed for discomfort.  No more than 4000 mg of Tylenol in a 24-hour period safely.    Follow-up with primary care and hand specialist as soon as possible.  Call tomorrow morning to make a follow-up appointment    Return with any new or worsening symptoms such as redness that starts to spread up the arm, numbness, tingling, abnormal discharge

## 2024-10-11 NOTE — ED PROVIDER NOTES
"Subjective   History of Present Illness  Patient is a 29-year-old male seen in our facility yesterday for hand laceration.  He returns today for wound check as he noted some increased swelling in his hand.  He states the pain is well-controlled with Tylenol and ibuprofen.  He denies any significant bleeding or drainage from the laceration site.  No new numbness or weakness of the hand.  No fever or chills.  He has not followed up with anyone in regards to hand injury since being seen here yesterday        Review of Systems   Constitutional:  Negative for fever.   Musculoskeletal:  Positive for joint swelling.   Skin:  Positive for wound.   Neurological:  Negative for weakness and numbness.       Past Medical History:   Diagnosis Date    GERD (gastroesophageal reflux disease)     Low back pain     Seizures        Allergies   Allergen Reactions    Tramadol Nausea And Vomiting     \"shakey\"       No past surgical history on file.    Family History   Problem Relation Age of Onset    COPD Mother     VIVIANA disease Father        Social History     Socioeconomic History    Marital status:    Tobacco Use    Smoking status: Every Day     Current packs/day: 0.00     Average packs/day: 0.3 packs/day for 8.0 years (2.0 ttl pk-yrs)     Types: Cigarettes, Electronic Cigarette     Start date: 2014     Last attempt to quit: 2022     Years since quittin.3    Smokeless tobacco: Never    Tobacco comments:     Every day smoker   Vaping Use    Vaping status: Former    Substances: Nicotine, CBD, Flavoring, Energy drink vapor    Devices: Refillable tank    Passive vaping exposure: Yes   Substance and Sexual Activity    Alcohol use: Yes     Comment: socially- drinks when back hurts    Drug use: Yes     Frequency: 4.0 times per week     Types: Marijuana     Comment: CBD pen- occ.    Sexual activity: Defer           Objective   Physical Exam  Vitals and nursing note reviewed.   Constitutional:       General: He is not in " "acute distress.     Appearance: He is well-developed. He is not ill-appearing, toxic-appearing or diaphoretic.   HENT:      Head: Normocephalic and atraumatic.      Mouth/Throat:      Mouth: Mucous membranes are moist.      Pharynx: Oropharynx is clear.   Eyes:      Extraocular Movements: Extraocular movements intact.      Pupils: Pupils are equal, round, and reactive to light.   Cardiovascular:      Rate and Rhythm: Normal rate and regular rhythm.      Pulses: Normal pulses.      Heart sounds: No murmur heard.     No friction rub. No gallop.   Pulmonary:      Effort: Pulmonary effort is normal. No tachypnea or accessory muscle usage.      Breath sounds: No decreased breath sounds, wheezing, rhonchi or rales.   Chest:      Chest wall: No mass, deformity, tenderness or crepitus.   Musculoskeletal:      Left wrist: Normal pulse.      Left hand: Swelling present. No bony tenderness. Normal capillary refill.        Hands:       Comments: Peripheral pulses are intact compartments are soft of left upper extremity.  He does have some bruising noted of the left hand from recent hand injury.  Radial, median, ulnar nerve intact.  Wound appears to be healing well.  Good capillary refill   Skin:     General: Skin is warm.      Capillary Refill: Capillary refill takes less than 2 seconds.      Findings: No rash.   Neurological:      Mental Status: He is alert and oriented to person, place, and time.   Psychiatric:         Mood and Affect: Mood normal.         Behavior: Behavior normal.         Procedures           ED Course    /94 (BP Location: Left arm, Patient Position: Sitting)   Pulse 83   Temp 98.1 °F (36.7 °C) (Oral)   Resp 18   Ht 172.7 cm (68\")   Wt 74 kg (163 lb 2.3 oz)   SpO2 99%   BMI 24.81 kg/m²   Medications   bacitracin ointment 0.9 g (has no administration in time range)     Labs Reviewed - No data to display  No orders to display                                              Medical Decision " Making  Appropriate PPE was worn during exam and throughout all encounters with the patient.  Patient presented to the ED for wound check states he had increased swelling of his hand after sustaining a laceration yesterday.  He was seen in our ED and had it repaired.  On exam today appears to be healing well there is some ecchymosis in the hand which is normal for this type of injury.  There is no signs of secondary infection such as cellulitis sensation is intact throughout.  Patient was given continued wound care instructions along with signs and symptoms to return.  All questions were answered.    This document is intended for medical expert use only. Reading of this document by patients and/or patient's family without participating medical staff guidance may result in misinterpretation and unintended morbidity.  Any interpretation of such data is the responsibility of the patient and/or family member responsible for the patient in concert with their primary or specialist providers, not to be left for sources of online searches such as [email or web address], [employer] or similar queries. Relying on these approaches to knowledge may result in misinterpretation, misguided goals of care and even death should patients or family members try recommendations outside of the realm of professional medical care in a supervised inpatient environment.       Problems Addressed:  Hand swelling: acute illness or injury    Risk  OTC drugs.        Final diagnoses:   Hand swelling       ED Disposition  ED Disposition       ED Disposition   Discharge    Condition   Stable    Comment   --               Sara Betancur, APRN  [address] [facility] [address]  [place] [address] [phone]    Schedule an appointment as soon as possible for a visit in 3 days      [facility] EMERGENCY DEPARTMENT  [address] [facility] [address] [phone]  Go to   If symptoms worsen    [facility] [place]  [address] [place] [address]  [address]  [facility] [address]  [phone]  Schedule an appointment as soon as possible for a visit            Medication List      No changes were made to your prescriptions during this visit.            Danae Sparrow PA  10/11/24 0945

## 2024-10-19 ENCOUNTER — HOSPITAL ENCOUNTER (EMERGENCY)
Facility: HOSPITAL | Age: 29
Discharge: HOME | End: 2024-10-19
Payer: MEDICAID

## 2024-10-19 VITALS
WEIGHT: 160.94 LBS | HEART RATE: 94 BPM | SYSTOLIC BLOOD PRESSURE: 125 MMHG | DIASTOLIC BLOOD PRESSURE: 71 MMHG | OXYGEN SATURATION: 99 % | RESPIRATION RATE: 18 BRPM | BODY MASS INDEX: 24.39 KG/M2 | TEMPERATURE: 98.2 F | HEIGHT: 68 IN

## 2024-10-19 DIAGNOSIS — Z48.02 VISIT FOR SUTURE REMOVAL: Primary | ICD-10-CM

## 2024-10-19 PROCEDURE — 99202 OFFICE O/P NEW SF 15 MIN: CPT

## 2024-10-19 NOTE — DISCHARGE INSTRUCTIONS
Keep the area clean and dry.  Watch for signs of infection.  Follow-up with primary care provider as needed.  Return to the ER for any new or worsening symptoms.

## 2024-10-19 NOTE — ED PROVIDER NOTES
"Subjective   History of Present Illness  Chief complaint: Suture removal      Context: Patient is a 29-year-old male who presents to the ER with request for suture removal.  Patient had 1 suture placed on 10/10 to left hand.  Patient has no complaints at this time.        PCP: Pipe                 Review of Systems   Constitutional:  Negative for fever.       Past Medical History:   Diagnosis Date    GERD (gastroesophageal reflux disease)     Low back pain     Seizures        Allergies   Allergen Reactions    Tramadol Nausea And Vomiting     \"shakey\"       No past surgical history on file.    Family History   Problem Relation Age of Onset    COPD Mother     VIVIANA disease Father        Social History     Socioeconomic History    Marital status:    Tobacco Use    Smoking status: Every Day     Current packs/day: 0.00     Average packs/day: 0.3 packs/day for 8.0 years (2.0 ttl pk-yrs)     Types: Cigarettes, Electronic Cigarette     Start date: 2014     Last attempt to quit: 2022     Years since quittin.3    Smokeless tobacco: Never    Tobacco comments:     Every day smoker   Vaping Use    Vaping status: Former    Substances: Nicotine, CBD, Flavoring, Energy drink vapor    Devices: Refillable tank    Passive vaping exposure: Yes   Substance and Sexual Activity    Alcohol use: Yes     Comment: socially- drinks when back hurts    Drug use: Yes     Frequency: 4.0 times per week     Types: Marijuana     Comment: CBD pen- occ.    Sexual activity: Defer           Objective   Physical Exam  Vitals and nursing note reviewed.   Constitutional:       Appearance: Normal appearance.   HENT:      Head: Normocephalic.   Eyes:      Extraocular Movements: Extraocular movements intact.      Pupils: Pupils are equal, round, and reactive to light.   Pulmonary:      Effort: Pulmonary effort is normal.   Musculoskeletal:      Cervical back: Normal range of motion.   Skin:     General: Skin is warm and dry.      Capillary " Refill: Capillary refill takes less than 2 seconds.      Findings: No erythema.      Comments: Suture present to posterior left hand.  No erythema, swelling, drainage noted.  Pulses present, neurovascularly intact.  Wound was well-approximated and healed.   Neurological:      General: No focal deficit present.      Mental Status: He is alert and oriented to person, place, and time.   Psychiatric:         Mood and Affect: Mood normal.         Behavior: Behavior normal.         Suture Removal    Date/Time: 10/19/2024 11:43 AM    Performed by: Estrellita Martinez APRN  Authorized by: Estrellita Martinez APRN    Consent:     Consent obtained:  Verbal    Consent given by:  Patient    Risks discussed:  Bleeding, pain and wound separation    Alternatives discussed:  No treatment  Universal protocol:     Patient identity confirmed:  Arm band  Location:     Location:  Upper extremity    Upper extremity location:  Hand    Hand location:  L hand  Procedure details:     Wound appearance:  No signs of infection    Number of sutures removed:  1  Post-procedure details:     Post-removal:  No dressing applied    Procedure completion:  Tolerated well, no immediate complications             ED Course        Labs Reviewed - No data to display  Medications - No data to display  No radiology results for the last day  Prior to Admission medications    Medication Sig Start Date End Date Taking? Authorizing Provider   clotrimazole (Lotrimin AF) 1 % cream Apply 1 application  topically to the appropriate area as directed 2 (Two) Times a Day. 10/28/23   Terese Kemp APRN   HYDROcodone-acetaminophen (NORCO) 5-325 MG per tablet Take 1 tablet by mouth Every 6 (Six) Hours As Needed for Moderate Pain. 10/2/24   Dorian Fraser PA-C   ibuprofen (ADVIL,MOTRIN) 800 MG tablet Take 1 tablet by mouth Every 6 (Six) Hours As Needed for Mild Pain. 10/2/24   Dorian Fraser PA-C   levETIRAcetam (KEPPRA) 500 MG tablet Take 1 tablet by mouth 2 (Two) Times  "a Day. 10/2/21   Rommel Almonte MD   omeprazole (priLOSEC) 20 MG capsule Take 1 capsule by mouth 2 (Two) Times a Day. 6/9/20   Jaquelin Starkey MD   ondansetron ODT (ZOFRAN-ODT) 4 MG disintegrating tablet Place 1 tablet on the tongue Every 6 (Six) Hours As Needed for Vomiting or Nausea. 6/7/23   Rommel Almonte MD                                          Medical Decision Making      /71 (BP Location: Left arm, Patient Position: Sitting)   Pulse 94   Temp 98.2 °F (36.8 °C) (Oral)   Resp 18   Ht 172.7 cm (68\")   Wt 73 kg (160 lb 15 oz)   SpO2 99%   BMI 24.47 kg/m²        Lab interpretation: Labs were considered but not emergently warranted at this time.      Patient presented for suture removal of 1 suture from posterior left hand was placed on 10/10 in this ER.  No signs of infection present, no erythema swelling or drainage.  Removed 1 stitch from posterior left hand.  Full range of motion present, neurovascularly intact, pink pulses present after suture removal.  Advised patient to keep area clean and dry, watch for any signs of infection and to return to the ER for any new or worsening symptoms.  Patient verbalized understanding of all discharge instructions.    Appropriate PPE worn during exam.      i discussed findings with patient who voices understanding of discharge instructions, signs and symptoms requiring return to ED; discharged improved and in stable condition with follow up for re-evaluation.  This document is intended for medical expert use only. Reading of this document by patients and/or patient's family without participating medical staff guidance may result in misinterpretation and unintended morbidity.  Any interpretation of such data is the responsibility of the patient and/or family member responsible for the patient in concert with their primary or specialist providers, not to be left for sources of online searches such as [employer], [employer] or similar queries. Relying on these " approaches to knowledge may result in misinterpretation, misguided goals of care and even death should patients or family members try recommendations outside of the realm of professional medical care in a supervised inpatient environment.     Discussed with Dr. Bauman.        Final diagnoses:   Visit for suture removal       ED Disposition  ED Disposition       ED Disposition   Discharge    Condition   Stable    Comment   --               Sara Betancur, APRN  [address] [id number] [phone]      As needed         Medication List      No changes were made to your prescriptions during this visit.            Estrellita Martinez, APRN  10/19/24 1145

## 2024-12-15 ENCOUNTER — APPOINTMENT (OUTPATIENT)
Dept: GENERAL RADIOLOGY | Facility: HOSPITAL | Age: 29
End: 2024-12-15
Payer: MEDICAID

## 2024-12-15 ENCOUNTER — HOSPITAL ENCOUNTER (EMERGENCY)
Facility: HOSPITAL | Age: 29
Discharge: HOME | End: 2024-12-15
Attending: EMERGENCY MEDICINE | Admitting: EMERGENCY MEDICINE
Payer: MEDICAID

## 2024-12-15 VITALS
RESPIRATION RATE: 16 BRPM | WEIGHT: 160.94 LBS | SYSTOLIC BLOOD PRESSURE: 110 MMHG | HEIGHT: 68 IN | OXYGEN SATURATION: 97 % | DIASTOLIC BLOOD PRESSURE: 74 MMHG | HEART RATE: 84 BPM | TEMPERATURE: 98.7 F | BODY MASS INDEX: 24.39 KG/M2

## 2024-12-15 DIAGNOSIS — M25.562 ACUTE PAIN OF LEFT KNEE: Primary | ICD-10-CM

## 2024-12-15 PROCEDURE — 99283 EMERGENCY DEPT VISIT LOW MDM: CPT

## 2024-12-15 PROCEDURE — 25010000002 KETOROLAC TROMETHAMINE PER 15 MG: Performed by: EMERGENCY MEDICINE

## 2024-12-15 PROCEDURE — 96372 THER/PROPH/DIAG INJ SC/IM: CPT

## 2024-12-15 PROCEDURE — 73562 X-RAY EXAM OF KNEE 3: CPT

## 2024-12-15 RX ORDER — KETOROLAC TROMETHAMINE 30 MG/ML
30 INJECTION, SOLUTION INTRAMUSCULAR; INTRAVENOUS ONCE
Status: COMPLETED | OUTPATIENT
Start: 2024-12-15 | End: 2024-12-15

## 2024-12-15 RX ORDER — NAPROXEN 500 MG/1
500 TABLET ORAL 2 TIMES DAILY PRN
Qty: 8 TABLET | Refills: 0 | Status: SHIPPED | OUTPATIENT
Start: 2024-12-15

## 2024-12-15 RX ADMIN — KETOROLAC TROMETHAMINE 30 MG: 30 INJECTION, SOLUTION INTRAMUSCULAR at 13:57

## 2024-12-15 NOTE — DISCHARGE INSTRUCTIONS
Cool compress, elevation, weight-bear as tolerated with crutches.  Ace wrap for support.  Return for fever, increased swelling, severe pain or any other concerns.

## 2024-12-15 NOTE — ED PROVIDER NOTES
"Subjective   History of Present Illness  29-year-old male states he had some pain in the anterior aspect left knee over the last 2 days.  He denies trauma or fevers or chills or swelling or numbness or weakness in extremity.  He reports no hip pain or thigh or calf pain.  States he woke up with the pain.  States the pain is worse with movement  Review of Systems    Past Medical History:   Diagnosis Date    GERD (gastroesophageal reflux disease)     Low back pain     Seizures        Allergies   Allergen Reactions    Tramadol Nausea And Vomiting     \"shakey\"       No past surgical history on file.    Family History   Problem Relation Age of Onset    COPD Mother     VIVIANA disease Father        Social History     Socioeconomic History    Marital status:    Tobacco Use    Smoking status: Every Day     Current packs/day: 0.00     Average packs/day: 0.3 packs/day for 8.0 years (2.0 ttl pk-yrs)     Types: Cigarettes, Electronic Cigarette     Start date: 2014     Last attempt to quit: 2022     Years since quittin.5    Smokeless tobacco: Never    Tobacco comments:     Every day smoker   Vaping Use    Vaping status: Former    Substances: Nicotine, CBD, Flavoring, Energy drink vapor    Devices: Refillable tank    Passive vaping exposure: Yes   Substance and Sexual Activity    Alcohol use: Yes     Comment: socially- drinks when back hurts    Drug use: Yes     Frequency: 4.0 times per week     Types: Marijuana     Comment: CBD pen- occ.    Sexual activity: Defer     Prior to Admission medications    Medication Sig Start Date End Date Taking? Authorizing Provider   clotrimazole (Lotrimin AF) 1 % cream Apply 1 application  topically to the appropriate area as directed 2 (Two) Times a Day. 10/28/23   Terese Kemp APRN   HYDROcodone-acetaminophen (NORCO) 5-325 MG per tablet Take 1 tablet by mouth Every 6 (Six) Hours As Needed for Moderate Pain. 10/2/24   Dorian Fraser, MICHAEL   levETIRAcetam (KEPPRA) 500 MG " "tablet Take 1 tablet by mouth 2 (Two) Times a Day. 10/2/21   Rommel Almonte MD   naproxen (EC NAPROSYN) 500 MG EC tablet Take 1 tablet by mouth 2 (Two) Times a Day As Needed for Mild Pain. 12/15/24   Jose Daniel Medeiros MD   omeprazole (priLOSEC) 20 MG capsule Take 1 capsule by mouth 2 (Two) Times a Day. 6/9/20   Jaquelin Starkey MD   ondansetron ODT (ZOFRAN-ODT) 4 MG disintegrating tablet Place 1 tablet on the tongue Every 6 (Six) Hours As Needed for Vomiting or Nausea. 6/7/23   Rommel Almonte MD   ibuprofen (ADVIL,MOTRIN) 800 MG tablet Take 1 tablet by mouth Every 6 (Six) Hours As Needed for Mild Pain. 10/2/24 12/15/24  Dorian Fraser PA-C     /72 (BP Location: Left arm, Patient Position: Sitting)   Pulse 86   Temp 98.7 °F (37.1 °C) (Oral)   Resp 20   Ht 172.7 cm (68\")   Wt 73 kg (160 lb 15 oz)   SpO2 98%   BMI 24.47 kg/m²         Objective   Physical Exam  General: Well-appearing, no acute distress  Psych: Oriented, pleasant affect  Respirations: Clear, nonlabored respirations  Extremity: There is some tenderness palpation about the anterior aspect of the left knee, normal quadriceps and patellar tendon strength, no palpable effusion, no overlying erythema or localized fever, calves and thighs are symmetric and nontender, normal pulses distally, normal hip exam, no ligamentous laxity  Skin: No rash, normal color  Procedures           ED Course                                                       Medical Decision Making  Differential diagnosis including septic arthritis, occult trauma, DVT, ischemia    Patient has no signs of infection or ischemia, exam was not suggestive of DVT.  Tendinitis is favored.  He was ordered a Ace wrap for support and crutches for weightbearing assistance.  He was prescribed Naprosyn.  He was ordered Toradol for some acute pain management in the ED.  Patient is agreeable plan he is discharged good condition was encouraged to follow-up with his primary care in 1 week " for reexam.  He was given warning signs for return.    Problems Addressed:  Acute pain of left knee: complicated acute illness or injury    Amount and/or Complexity of Data Reviewed  Radiology: ordered and independent interpretation performed.     Details: My independent interpretation of x-ray image of the left knee no apparent acute bony injury    Risk  Prescription drug management.        Final diagnoses:   Acute pain of left knee       ED Disposition  ED Disposition       ED Disposition   Discharge    Condition   Stable    Comment   --               Sara Betancur, APRN  [address] [facility] [address] [facility] [address] [place] [address] [place] [address] [phone]    In 1 week           Medication List        New Prescriptions      naproxen 500 MG EC tablet  Commonly known as: EC NAPROSYN  Take 1 tablet by mouth 2 (Two) Times a Day As Needed for Mild Pain.               Where to Get Your Medications        These medications were sent to [facility] [id number] - [place], [place] - [address] - [phone]  - [phone] FX  [address] [facility] [place] [address]      Phone: [phone]   naproxen 500 MG EC tablet            Jose Daniel Medeiros MD  12/15/24 8739

## 2025-02-19 ENCOUNTER — TELEPHONE (OUTPATIENT)
Dept: FAMILY MEDICINE CLINIC | Facility: CLINIC | Age: 30
End: 2025-02-19
Payer: MEDICAID

## 2025-02-19 NOTE — TELEPHONE ENCOUNTER
Patient's mother called requesting a referral to pain management. Advised he has not been seen in 2 years, therefore he must schedule an appt to be evaluated for this referral. Mom states she will call back.

## 2025-03-07 ENCOUNTER — APPOINTMENT (OUTPATIENT)
Dept: GENERAL RADIOLOGY | Facility: HOSPITAL | Age: 30
End: 2025-03-07
Payer: MEDICAID

## 2025-03-07 ENCOUNTER — HOSPITAL ENCOUNTER (EMERGENCY)
Facility: HOSPITAL | Age: 30
Discharge: LEFT BEFORE DISCHARGE | End: 2025-03-07
Attending: EMERGENCY MEDICINE
Payer: MEDICAID

## 2025-03-07 VITALS
HEIGHT: 68 IN | RESPIRATION RATE: 18 BRPM | OXYGEN SATURATION: 100 % | HEART RATE: 78 BPM | SYSTOLIC BLOOD PRESSURE: 130 MMHG | TEMPERATURE: 97.9 F | WEIGHT: 161.38 LBS | DIASTOLIC BLOOD PRESSURE: 83 MMHG | BODY MASS INDEX: 24.46 KG/M2

## 2025-03-07 PROCEDURE — 99211 OFF/OP EST MAY X REQ PHY/QHP: CPT

## 2025-08-02 ENCOUNTER — HOSPITAL ENCOUNTER (EMERGENCY)
Facility: HOSPITAL | Age: 30
Discharge: HOME | End: 2025-08-02
Attending: EMERGENCY MEDICINE
Payer: MEDICAID

## 2025-08-02 ENCOUNTER — APPOINTMENT (OUTPATIENT)
Dept: GENERAL RADIOLOGY | Facility: HOSPITAL | Age: 30
End: 2025-08-02
Payer: MEDICAID

## 2025-08-02 VITALS
DIASTOLIC BLOOD PRESSURE: 75 MMHG | WEIGHT: 167.99 LBS | BODY MASS INDEX: 25.46 KG/M2 | OXYGEN SATURATION: 93 % | RESPIRATION RATE: 16 BRPM | HEART RATE: 75 BPM | HEIGHT: 68 IN | TEMPERATURE: 97.7 F | SYSTOLIC BLOOD PRESSURE: 116 MMHG

## 2025-08-02 DIAGNOSIS — M25.512 ACUTE PAIN OF LEFT SHOULDER: Primary | ICD-10-CM

## 2025-08-02 PROCEDURE — 25010000002 KETOROLAC TROMETHAMINE PER 15 MG

## 2025-08-02 PROCEDURE — 73030 X-RAY EXAM OF SHOULDER: CPT

## 2025-08-02 PROCEDURE — 99283 EMERGENCY DEPT VISIT LOW MDM: CPT

## 2025-08-02 PROCEDURE — 96372 THER/PROPH/DIAG INJ SC/IM: CPT

## 2025-08-02 RX ORDER — KETOROLAC TROMETHAMINE 30 MG/ML
30 INJECTION, SOLUTION INTRAMUSCULAR; INTRAVENOUS ONCE
Status: COMPLETED | OUTPATIENT
Start: 2025-08-02 | End: 2025-08-02

## 2025-08-02 RX ADMIN — KETOROLAC TROMETHAMINE 30 MG: 30 INJECTION INTRAMUSCULAR; INTRAVENOUS at 12:02

## 2025-08-02 NOTE — DISCHARGE INSTRUCTIONS
Anti-inflammatories including Motrin Aleve or ibuprofen.  Ibuprofen can be taken 600 mg every 6 hours, Tylenol in between  Follow-up with orthopedist    Follow-up with pulmonologist for continued sleep evaluation    Return as needed

## 2025-08-02 NOTE — ED PROVIDER NOTES
"Subjective   History of Present Illness  Chief Complaint: Left shoulder pain      HPI: Patient is a 30-year-old male who presents by private vehicle with complaints of left shoulder pain he states symptoms started yesterday, sleep disturbance due to discomfort.  Decreased range of motion due to pain.  States he has dislocated the shoulder in the past he has felt a pop in the extremity in the last few days.    PCP: none on file     History provided by:  Patient      Review of Systems  See above as noted     Past Medical History:   Diagnosis Date    GERD (gastroesophageal reflux disease)     Low back pain     Seizures     not had one for 3-4 yrs       Allergies   Allergen Reactions    Tramadol Nausea And Vomiting     \"shakey\"       No past surgical history on file.    Family History   Problem Relation Age of Onset    COPD Mother     VIVIANA disease Father        Social History     Socioeconomic History    Marital status:    Tobacco Use    Smoking status: Every Day     Current packs/day: 0.00     Average packs/day: 0.3 packs/day for 8.0 years (2.0 ttl pk-yrs)     Types: Cigarettes, Electronic Cigarette     Start date: 6/16/2014     Last attempt to quit: 6/16/2022     Years since quitting: 3.1    Smokeless tobacco: Never    Tobacco comments:     Every day smoker   Vaping Use    Vaping status: Every Day    Substances: Nicotine, CBD, Flavoring, Energy drink vapor    Devices: Refillable tank    Passive vaping exposure: Yes   Substance and Sexual Activity    Alcohol use: Yes     Comment: occ    Drug use: Yes     Frequency: 4.0 times per week     Types: Marijuana     Comment: CBD pen- occ.    Sexual activity: Defer           Objective   Physical Exam  Vitals reviewed.   HENT:      Head: Normocephalic.   Eyes:      Pupils: Pupils are equal, round, and reactive to light.   Cardiovascular:      Pulses: Normal pulses.   Pulmonary:      Effort: Pulmonary effort is normal.      Breath sounds: Normal breath sounds. " "  Musculoskeletal:         General: Tenderness present.      Comments: Decreased range of motion due to discomfort and move the extremity to approximately 45 degrees laterally there is no clicking or popping no crepitus.  No obvious deformity no overlying erythema or ecchymosis, distal neurovascular intact   Neurological:      General: No focal deficit present.      Mental Status: He is alert and oriented to person, place, and time.         Procedures           ED Course      /75   Pulse 75   Temp 97.7 °F (36.5 °C) (Oral)   Resp 16   Ht 172.7 cm (68\")   Wt 76.2 kg (167 lb 15.9 oz)   SpO2 93%   BMI 25.54 kg/m²   Labs Reviewed - No data to display  Medications   ketorolac (TORADOL) injection 30 mg (30 mg Intramuscular Given 8/2/25 1202)     XR Shoulder 2+ View Left  Result Date: 8/2/2025  Impression: Negative Electronically Signed: Enrique Siddiqui  8/2/2025 12:24 PM EDT  Workstation ID: [id number]                                                     Medical Decision Making  Of complaints, noted physical exam was completed x-rays were obtained negative for fracture or dislocation as patient has reported dislocation in the past we discussed repeated dislocations as well as potential ligamental injury.  We discussed anti-inflammatories as well as nonpharmacological treatment of his discomfort he was given passive range of motion exercises and advised to follow-up with orthopedics for additional evaluation.  Female family member at bedside reports that a staff member told him that he has sleep apnea given phone number for pulmonology follow-up for additional testing.  He has no complaints otherwise related to this.     Chart review: 6/[place]/20[place] [facility] [place]    Labs: not warranted for this visit.     Radiology: Imaging reviewed by me and interpreted by radiologist.    Medications Medications  ketorolac (TORADOL) injection 30 mg (30 mg Intramuscular Given 8/2/25 1202)    Part of this " note may be an electronic transcription/translation of spoken language to printed text using the Dragon Dictation System.    Appropriate PPE worn during exam.      Note Disclaimer: At [facility], we believe that sharing information builds trust and better  relationships. You are receiving this note because you recently visited [facility]. It is possible you will see health information before a provider has talked with you about it. This kind of information can be easy to misunderstand. To help you fully understand what it means for your health, we urge you to discuss this note with your provider.      Problems Addressed:  Acute pain of left shoulder: complicated acute illness or injury    Amount and/or Complexity of Data Reviewed  Radiology: ordered and independent interpretation performed. Decision-making details documented in ED Course.    Risk  Prescription drug management.        Final diagnoses:   Acute pain of left shoulder       ED Disposition  ED Disposition       ED Disposition   Discharge    Condition   Stable    Comment   --               PATIENT CONNECTION - [address] [address]  [phone]  Schedule an appointment as soon as possible for a visit in 1 week  As needed, If symptoms worsen    Shiva Jeong MD  [address] [place] [address] [place] [facility]  [phone]          Dale Solis MD  [address] [facility] [place] [facility]  [phone]               Medication List      No changes were made to your prescriptions during this visit.            Massiel Tavera, APRN  08/02/25 1401

## 2025-08-02 NOTE — ED NOTES
Patient reports waking up to left shoulder pain. Patient states he has a history of shoulder dislocation. Patient range of motion of left arm limited, only able to lift a couple inches. Patient also reports pain and swelling to left wrist. No known injury.

## 2025-08-26 ENCOUNTER — OFFICE VISIT (OUTPATIENT)
Dept: ORTHOPEDIC SURGERY | Facility: CLINIC | Age: 30
End: 2025-08-26
Payer: MEDICAID

## 2025-08-26 VITALS — WEIGHT: 167 LBS | HEIGHT: 68 IN | HEART RATE: 104 BPM | BODY MASS INDEX: 25.31 KG/M2 | OXYGEN SATURATION: 98 %

## 2025-08-26 DIAGNOSIS — M25.512 ACUTE PAIN OF LEFT SHOULDER: Primary | ICD-10-CM
